# Patient Record
Sex: MALE | Race: WHITE | NOT HISPANIC OR LATINO | URBAN - METROPOLITAN AREA
[De-identification: names, ages, dates, MRNs, and addresses within clinical notes are randomized per-mention and may not be internally consistent; named-entity substitution may affect disease eponyms.]

---

## 2023-04-04 DIAGNOSIS — I48.91 UNSPECIFIED ATRIAL FIBRILLATION (MULTI): ICD-10-CM

## 2023-04-04 RX ORDER — RIVAROXABAN 20 MG/1
TABLET, FILM COATED ORAL
Qty: 90 TABLET | Refills: 0 | Status: SHIPPED | OUTPATIENT
Start: 2023-04-04 | End: 2023-08-11

## 2023-05-19 ENCOUNTER — OFFICE VISIT (OUTPATIENT)
Dept: PRIMARY CARE | Facility: CLINIC | Age: 58
End: 2023-05-19
Payer: COMMERCIAL

## 2023-05-19 VITALS
SYSTOLIC BLOOD PRESSURE: 127 MMHG | HEART RATE: 85 BPM | BODY MASS INDEX: 42.97 KG/M2 | DIASTOLIC BLOOD PRESSURE: 76 MMHG | WEIGHT: 315 LBS

## 2023-05-19 DIAGNOSIS — M54.6 ACUTE THORACIC BACK PAIN, UNSPECIFIED BACK PAIN LATERALITY: Primary | ICD-10-CM

## 2023-05-19 PROBLEM — F32.A DEPRESSION: Status: ACTIVE | Noted: 2023-05-19

## 2023-05-19 PROBLEM — N13.8 BPH WITH OBSTRUCTION/LOWER URINARY TRACT SYMPTOMS: Status: ACTIVE | Noted: 2023-05-19

## 2023-05-19 PROBLEM — F41.9 ANXIETY DISORDER: Status: ACTIVE | Noted: 2023-05-19

## 2023-05-19 PROBLEM — C62.12 MALIGNANT NEOPLASM OF DESCENDED LEFT TESTIS (MULTI): Status: ACTIVE | Noted: 2023-05-19

## 2023-05-19 PROBLEM — K21.9 GASTROESOPHAGEAL REFLUX DISEASE: Status: ACTIVE | Noted: 2023-05-19

## 2023-05-19 PROBLEM — Z98.84 GASTRIC BYPASS STATUS FOR OBESITY: Status: ACTIVE | Noted: 2023-05-19

## 2023-05-19 PROBLEM — E29.1 HYPOGONADISM IN MALE: Status: ACTIVE | Noted: 2018-02-06

## 2023-05-19 PROBLEM — N40.1 BPH WITH OBSTRUCTION/LOWER URINARY TRACT SYMPTOMS: Status: ACTIVE | Noted: 2023-05-19

## 2023-05-19 PROBLEM — E03.9 HYPOTHYROIDISM: Status: ACTIVE | Noted: 2023-05-19

## 2023-05-19 PROBLEM — I48.91 AFIB (MULTI): Status: ACTIVE | Noted: 2023-05-19

## 2023-05-19 PROCEDURE — 1036F TOBACCO NON-USER: CPT | Performed by: INTERNAL MEDICINE

## 2023-05-19 PROCEDURE — 99213 OFFICE O/P EST LOW 20 MIN: CPT | Performed by: INTERNAL MEDICINE

## 2023-05-19 RX ORDER — LEVOTHYROXINE SODIUM 50 UG/1
50 TABLET ORAL
COMMUNITY
End: 2023-10-19 | Stop reason: ALTCHOICE

## 2023-05-19 RX ORDER — BUPROPION HYDROCHLORIDE 150 MG/1
150 TABLET, EXTENDED RELEASE ORAL 2 TIMES DAILY
COMMUNITY
End: 2024-04-09 | Stop reason: SDUPTHER

## 2023-05-19 RX ORDER — ESOMEPRAZOLE MAGNESIUM 40 MG/1
40 CAPSULE, DELAYED RELEASE ORAL
COMMUNITY
End: 2023-08-02

## 2023-05-19 RX ORDER — SILDENAFIL 100 MG/1
100 TABLET, FILM COATED ORAL AS NEEDED
COMMUNITY
Start: 2014-04-22 | End: 2023-11-17

## 2023-05-19 RX ORDER — METHOCARBAMOL 500 MG/1
500 TABLET, FILM COATED ORAL 3 TIMES DAILY
Qty: 15 TABLET | Refills: 0 | Status: SHIPPED | OUTPATIENT
Start: 2023-05-19 | End: 2023-10-19 | Stop reason: ALTCHOICE

## 2023-05-19 ASSESSMENT — PATIENT HEALTH QUESTIONNAIRE - PHQ9
2. FEELING DOWN, DEPRESSED OR HOPELESS: NOT AT ALL
SUM OF ALL RESPONSES TO PHQ9 QUESTIONS 1 AND 2: 0
1. LITTLE INTEREST OR PLEASURE IN DOING THINGS: NOT AT ALL

## 2023-05-19 NOTE — ASSESSMENT & PLAN NOTE
Musculoskeletal back pain.  Encourage stretches and exercises.  We will provide muscle relaxer.  Continue Tylenol as needed avoid ibuprofen.  Consider physical therapy if no improvement in 1 to 2 weeks  Ice area

## 2023-05-19 NOTE — PROGRESS NOTES
Subjective   Patient ID: Matty Breaux is a 57 y.o. male who presents for Back Pain (Mid back pain).    HPI     Patient is a 57-year-old male with past medical history of atrial fibrillation depression anxiety hypothyroidism who presents with chief complaint of mid back pain.  Is located in the T10 region.  It mostly affecting the right side.  No alleviating factors.  He denies any strain or injury.  Tried Tylenol and ibuprofen without significant relief.  Pain is 5 out of 10 in intensity and is not shooting.  Described as dull and achy    Review of Systems  Constitutional: No fever or chills  Cardiovascular: no chest pain, no palpitations and no syncope.   Respiratory: no cough, no shortness of breath during exertion and no shortness of breath at rest.   Gastrointestinal: no abdominal pain, no nausea and no vomiting.  Neuro: No Headache, no dizziness    Objective   /76   Pulse 85   Wt (!) 160 kg (353 lb)   BMI 42.97 kg/m²     Physical Exam  Constitutional: Alert and in no acute distress. Well developed, well nourished  Head and Face: Head and face: Normal.    Cardiovascular: Heart rate and rhythm were normal, normal S1 and S2. No peripheral edema.   Pulmonary: No respiratory distress. Clear bilateral breath sounds.  Musculoskeletal: Paraspinal musculature tenderness to palpation around the right sided teeth 8 through T10 area  Skin: Normal skin color and pigmentation, normal skin turgor, and no rash.    Psychiatric: Judgment and insight: Intact. Mood and affect: Normal.        Lab Results   Component Value Date    WBC 6.8 02/13/2023    HGB 12.5 (L) 02/13/2023    HCT 45.0 02/13/2023     02/13/2023    CHOL 119 02/13/2023    TRIG 56 02/13/2023    HDL 58.3 02/13/2023    ALT 19 02/13/2023    AST 19 02/13/2023     02/13/2023    K 4.8 02/13/2023     02/13/2023    CREATININE 1.05 02/13/2023    BUN 10 02/13/2023    CO2 30 02/13/2023    TSH 3.46 02/13/2023    PSA 2.12 09/15/2022        Electrocardiogram 12 Lead  Atrial fibrillation with premature ventricular or aberrantly conducted complexes  Right bundle branch block  Septal infarct , age undetermined  Abnormal ECG  No previous ECGs available            Assessment/Plan   Problem List Items Addressed This Visit          Other    Acute thoracic back pain - Primary     Musculoskeletal back pain.  Encourage stretches and exercises.  We will provide muscle relaxer.  Continue Tylenol as needed avoid ibuprofen.  Consider physical therapy if no improvement in 1 to 2 weeks  Ice area         Relevant Medications    methocarbamol (Robaxin) 500 mg tablet

## 2023-07-30 DIAGNOSIS — I48.91 ATRIAL FIBRILLATION, UNSPECIFIED TYPE (MULTI): Primary | ICD-10-CM

## 2023-07-31 RX ORDER — METOPROLOL SUCCINATE 25 MG/1
25 TABLET, EXTENDED RELEASE ORAL DAILY
Qty: 90 TABLET | Refills: 1 | Status: SHIPPED | OUTPATIENT
Start: 2023-07-31

## 2023-08-02 DIAGNOSIS — K21.9 GASTRO-ESOPHAGEAL REFLUX DISEASE WITHOUT ESOPHAGITIS: ICD-10-CM

## 2023-08-02 RX ORDER — ESOMEPRAZOLE MAGNESIUM 40 MG/1
40 CAPSULE, DELAYED RELEASE ORAL DAILY
Qty: 90 CAPSULE | Refills: 1 | Status: SHIPPED | OUTPATIENT
Start: 2023-08-02 | End: 2024-02-04

## 2023-08-10 DIAGNOSIS — I48.91 UNSPECIFIED ATRIAL FIBRILLATION (MULTI): ICD-10-CM

## 2023-08-24 LAB — PROSTATE SPECIFIC AG (NG/ML) IN SER/PLAS: 2.45 NG/ML (ref 0–4)

## 2023-08-30 LAB
TESTOSTERONE FREE (CHAN): 277.5 PG/ML (ref 35–155)
TESTOSTERONE,TOTAL,LC-MS/MS: 1239 NG/DL (ref 250–1100)

## 2023-10-15 PROBLEM — R07.9 CHEST PAIN: Status: ACTIVE | Noted: 2023-10-15

## 2023-10-15 PROBLEM — R22.1 LUMP IN NECK: Status: ACTIVE | Noted: 2023-10-15

## 2023-10-15 PROBLEM — R06.02 SOB (SHORTNESS OF BREATH): Status: ACTIVE | Noted: 2023-10-15

## 2023-10-15 PROBLEM — M25.561 RIGHT KNEE PAIN: Status: ACTIVE | Noted: 2023-10-15

## 2023-10-15 PROBLEM — E29.1 HYPOGONADISM MALE: Status: ACTIVE | Noted: 2023-10-15

## 2023-10-15 PROBLEM — R19.00 ABDOMINAL MASS: Status: ACTIVE | Noted: 2023-10-15

## 2023-10-15 PROBLEM — F40.298 OTHER SPECIFIED PHOBIA: Status: ACTIVE | Noted: 2023-10-15

## 2023-10-15 PROBLEM — G56.02 CARPAL TUNNEL SYNDROME OF LEFT WRIST: Status: ACTIVE | Noted: 2023-10-15

## 2023-10-15 PROBLEM — N52.9 ERECTILE DYSFUNCTION: Status: ACTIVE | Noted: 2023-10-15

## 2023-10-15 PROBLEM — F32.9 MAJOR DEPRESSION: Status: ACTIVE | Noted: 2023-10-15

## 2023-10-15 PROBLEM — R79.89 LOW TESTOSTERONE IN MALE: Status: ACTIVE | Noted: 2023-10-15

## 2023-10-15 PROBLEM — D17.0 LIPOMA OF SKIN AND SUBCUTANEOUS TISSUE OF NECK: Status: ACTIVE | Noted: 2023-10-15

## 2023-10-15 PROBLEM — G56.01 CARPAL TUNNEL SYNDROME OF RIGHT WRIST: Status: ACTIVE | Noted: 2023-10-15

## 2023-10-15 PROBLEM — M25.512 LEFT SHOULDER PAIN: Status: ACTIVE | Noted: 2023-10-15

## 2023-10-15 PROBLEM — J98.6 DIAPHRAGMATIC DISORDER: Status: ACTIVE | Noted: 2023-10-15

## 2023-10-15 PROBLEM — S49.92XA INJURY OF LEFT SHOULDER: Status: ACTIVE | Noted: 2023-10-15

## 2023-10-15 PROBLEM — K21.9 ESOPHAGEAL REFLUX: Status: ACTIVE | Noted: 2023-10-15

## 2023-10-15 PROBLEM — M72.0 DUPUYTREN CONTRACTURE: Status: ACTIVE | Noted: 2023-10-15

## 2023-10-15 RX ORDER — MIRTAZAPINE 30 MG/1
30 TABLET, FILM COATED ORAL NIGHTLY
COMMUNITY
Start: 2023-06-15 | End: 2024-04-09 | Stop reason: SDUPTHER

## 2023-10-15 RX ORDER — COVID-19 ANTIGEN TEST
KIT MISCELLANEOUS
COMMUNITY
Start: 2022-12-22 | End: 2023-12-26 | Stop reason: ALTCHOICE

## 2023-10-15 RX ORDER — SYRINGE WITH NEEDLE, 1 ML 25GX5/8"
SYRINGE, EMPTY DISPOSABLE MISCELLANEOUS
COMMUNITY
Start: 2023-05-23

## 2023-10-18 PROBLEM — F32.89 OTHER SPECIFIED DEPRESSIVE EPISODES: Status: ACTIVE | Noted: 2023-10-18

## 2023-10-19 ENCOUNTER — TELEMEDICINE (OUTPATIENT)
Dept: BEHAVIORAL HEALTH | Facility: CLINIC | Age: 58
End: 2023-10-19
Payer: COMMERCIAL

## 2023-10-19 DIAGNOSIS — F43.10 PTSD (POST-TRAUMATIC STRESS DISORDER): ICD-10-CM

## 2023-10-19 DIAGNOSIS — F33.41 RECURRENT MAJOR DEPRESSIVE DISORDER, IN PARTIAL REMISSION (CMS-HCC): ICD-10-CM

## 2023-10-19 DIAGNOSIS — F41.9 ANXIETY DISORDER, UNSPECIFIED TYPE: ICD-10-CM

## 2023-10-19 PROCEDURE — 99214 OFFICE O/P EST MOD 30 MIN: CPT | Performed by: PSYCHIATRY & NEUROLOGY

## 2023-10-19 RX ORDER — HYDROXYZINE PAMOATE 25 MG/1
25 CAPSULE ORAL DAILY PRN
Qty: 30 CAPSULE | Refills: 2 | Status: SHIPPED | OUTPATIENT
Start: 2023-10-19 | End: 2024-04-09 | Stop reason: SDUPTHER

## 2023-10-19 NOTE — PROGRESS NOTES
"Outpatient Psychiatry- Follow up visit    Subjective   Jerry Breaux, a 57 y.o. male, presenting for follow up visit for depression, anxiety and PTSD     HPI:\"Since it is the end of summer, I have to go close everything up at the camper for the winter.\"    Follow up with pt via virtual visit, last visit was  8/24/23, prior to that had in person visit on 6/22/23.     States mood has been pretty good overall. 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 Slipped at the campground recently and had muscles spasms in his back, plans to go back to the gym at the beginning of next week.    States he and his wife were not able to go watch a sunset to commemorate the 2 year anniversary of his mother's death as they had planned but still plan to do that.     Has been seeing therapist every 2 weeks, but she left the practice so he has to find a new therapist, waiting to her what is available to him through his insurance.    States he hasn't needed to use the hydroxyzine 25mg which he uses occasionally for anxiety, as he has not been around crowds or a plane.     Saw urology in follow up appt, testosterone injections decreased from weekly to every 10 days due to supratherapeutic levels. Has done the 10 day interval 3 times now, notices he feels rodríguez and tired by the end of the 10 day period but it is getting better.    OARRS checked and reviewed, filled Testosterone on 9/22/23, no concerns for misuse.          Psychiatric Review Of Systems:  Depressive Symptoms: negative and energy  Manic Symptoms: negative  Anxiety Symptoms: General Anxiety Disorder (CANDELARIO)CANDELARIO Behaviors: excessive anxiety/worry  Psychotic Symptoms: negative  Other Symptoms:    Current Medications:    Current Outpatient Medications:     BD Luer-Mara Syringe 3 mL 25 x 1 1/2 \" syringe, USE AS DIRECTED, Disp: , Rfl:     Flowflex COVID-19 Ag Home Test kit, use as directed, Disp: , Rfl:     mirtazapine (Remeron) 30 mg tablet, Take 1 tablet " "(30 mg) by mouth once daily at bedtime., Disp: , Rfl:     buPROPion SR (Wellbutrin SR) 150 mg 12 hr tablet, Take 1 tablet (150 mg) by mouth twice a day., Disp: , Rfl:     esomeprazole (NexIUM) 40 mg DR capsule, TAKE 1 CAPSULE BY MOUTH EVERY DAY, Disp: 90 capsule, Rfl: 1    levothyroxine (Synthroid, Levoxyl) 50 mcg tablet, Take 1 tablet (50 mcg) by mouth once daily., Disp: , Rfl:     methocarbamol (Robaxin) 500 mg tablet, Take 1 tablet (500 mg) by mouth 3 times a day., Disp: 15 tablet, Rfl: 0    metoprolol succinate XL (Toprol-XL) 25 mg 24 hr tablet, TAKE 1 TABLET DAILY, Disp: 90 tablet, Rfl: 1    NON FORMULARY, BD plastipak syringe 3ML; use as directed, Disp: , Rfl:     rivaroxaban (Xarelto) 20 mg tablet, Take 1 tablet (20 mg) by mouth once daily., Disp: 90 tablet, Rfl: 1    sildenafil (Viagra) 100 mg tablet, Take 1 tablet (100 mg) by mouth if needed for erectile dysfunction., Disp: , Rfl:     syringe with needle 3 mL 23 x 1\" syringe, , Disp: , Rfl:     Medical History:  Past Medical History:   Diagnosis Date    Depression, unspecified 08/04/2021    Depression    Obstructive sleep apnea (adult) (pediatric)     Obstructive sleep apnea    Personal history of other diseases of the musculoskeletal system and connective tissue     History of osteoarthritis    Personal history of other diseases of the musculoskeletal system and connective tissue     History of degenerative disc disease    Personal history of other diseases of the respiratory system 06/03/2014    History of acute bronchitis    Unspecified atrial fibrillation (CMS/[facility]) 11/24/2013    Atrial fibrillation         Substance Use History:  Tobacco use: denies  Use of alcohol: occasional, social use  Use of caffeine:  minimal  Use of other substances: denies  Record Review: brief     Medical Review Of Systems:  Pertinent items are noted in HPI.    Objective   Mental Status Exam  Appearance: Well groomed and dressed  Attitude: Calm, cooperative, and engaged in " "conversation.  Behavior: Appropriate eye contact.   Motor Activity: No psychomotor agitation or retardation. No abnormal movements, tremors or tics. No evidence of extrapyramidal symptoms or tardive dyskinesia.  Speech: Regular rate, rhythm, volume. Spontaneous, no pressured speech.  Mood: \"pretty good\"  Affect: Euthymic, full range, mood congruent.  Thought Process: Linear, logical, and goal-directed. No loose associations or gross thought disorganization.  Thought Content: Denied current suicidal ideation or thoughts of harm to self, denied homicidal ideation or thoughts of harm to others. No delusional thinking elicited. No perseverations or obsessions identified.   Perception: Did not endorse auditory or visual hallucinations, did not appear to be responding to hallucinatory stimuli.   Cognition: Alert, oriented x3. Preserved attention span and concentration, recent and remote memory. Adequate fund of knowledge. No deficits in language.   Insight: Fair, in regards to understanding mental health condition  Judgement: Fair      Vitals:     There were no vitals filed for this visit.  Orders Only on 08/24/2023   Component Date Value Ref Range Status    Testosterone, Total, LC-MS/MS 08/24/2023 1,239 (H)  250 - 1,100 ng/dL Final    Testosterone, Free 08/24/2023 277.5 (H)  35.0 - 155.0 pg/mL Final    PSA 08/24/2023 2.45  0.00 - 4.00 ng/mL Final       Risk Assessment:  Risk of harm to self: Low Risk -- Risk factors include: Depression, Gender, and Medical illness comorbidity  Protective factors include:Denies current suicidal ideation, Willingness to seek help and support , Skills in problem solving, conflict resolution, and nonviolent handling of disputes, Access to a variety of clinical interventions , Receiving and engaged in care for mental, physical, and substance use disorders , History of adhering to treatment recommendations and/or prescribed medication regimen , Support through ongoing medical and mental " healthcare relationships , Current/history of good response to treatment/meds , and Interpersonal relationships and supports, e.g., family, friends, peers, community     Risk of harm to others: Low risk, no risk actors identified    There are no diagnoses linked to this encounter.   Plan/Recommendations:  Medications: Continue Bupropion SR 150mg bid, mirtazapine 30mg at bedtime and hydroxyzine 25mg daily as needed  Follow up with medical providers as needed  Follow up: 2 months  on 12/12/23 at 9:30am for 390 min virtual visit  Call  Psychiatry at [phone] with issues.    Review with patient:   Time Spent:  Prep time: 5min  Direct patient time: 23 min  Documentation time: 10 min  Total time: 38 min    Марина Santo MD

## 2023-11-06 DIAGNOSIS — F41.9 ANXIETY DISORDER, UNSPECIFIED TYPE: ICD-10-CM

## 2023-11-06 RX ORDER — HYDROXYZINE PAMOATE 25 MG/1
25 CAPSULE ORAL DAILY PRN
Qty: 30 CAPSULE | Refills: 2 | Status: CANCELLED | OUTPATIENT
Start: 2023-11-06 | End: 2024-02-04

## 2023-12-04 PROBLEM — N52.9 ED (ERECTILE DYSFUNCTION) OF ORGANIC ORIGIN: Status: ACTIVE | Noted: 2018-02-06

## 2023-12-12 PROBLEM — F43.10 PTSD (POST-TRAUMATIC STRESS DISORDER): Status: ACTIVE | Noted: 2023-12-12

## 2023-12-20 DIAGNOSIS — N52.9 ERECTILE DYSFUNCTION, UNSPECIFIED ERECTILE DYSFUNCTION TYPE: ICD-10-CM

## 2023-12-21 RX ORDER — SILDENAFIL 100 MG/1
TABLET, FILM COATED ORAL
Qty: 30 TABLET | Refills: 0 | Status: SHIPPED | OUTPATIENT
Start: 2023-12-21 | End: 2024-02-05

## 2023-12-26 ENCOUNTER — TELEMEDICINE (OUTPATIENT)
Dept: BEHAVIORAL HEALTH | Facility: CLINIC | Age: 58
End: 2023-12-26
Payer: COMMERCIAL

## 2023-12-26 DIAGNOSIS — F43.10 PTSD (POST-TRAUMATIC STRESS DISORDER): ICD-10-CM

## 2023-12-26 DIAGNOSIS — F33.40 RECURRENT MAJOR DEPRESSIVE DISORDER, IN REMISSION (CMS-HCC): ICD-10-CM

## 2023-12-26 DIAGNOSIS — F41.9 ANXIETY DISORDER, UNSPECIFIED TYPE: ICD-10-CM

## 2023-12-26 PROCEDURE — 99214 OFFICE O/P EST MOD 30 MIN: CPT | Performed by: PSYCHIATRY & NEUROLOGY

## 2023-12-26 NOTE — PROGRESS NOTES
"Outpatient Psychiatry- Follow up visit    Subjective   Matty Breaux \"Jerry\", a 58 y.o. male, presenting for follow up visit for Depression, anxiety and PTSD     HPI:     \"Things have been OK. Things have been kind of stressful with the holidays and my wife's having difficulty with her daughter in law. I have been trying to be calm and supportive. And I am feeling it a lot that my parents are gone now.\"     Follow up with pt via virtual visit, last visit was 10/19/23, last had in person visit on 6/22/23.      States mood has been pretty good overall.  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 Slipped at the campground prior to last visit and is still experiencing back spasms, has seen PCP for that.     Had been seeing therapist every 2 weeks, but she left the practice so he has to find a new therapist, waiting to her what is available to him through his new [employer] insurance.     States he used the hydroxyzine 25mg yesterday for anxiety and prior to that approx a months ago.      Prior to last visit, testosterone injections were decreased from weekly to every 10 days due to supratherapeutic levels. Has done the 10 day interval 6 times now, notices he still feels rodríguez and tired by the end of the 10 day period, is going to get testosterone levels checked again soon and follow up with urologist.     OARRS checked and reviewed, filled Testosterone on 11/24/23, prior to that on 10/24/23, no concerns for misuse.       Psychiatric Review Of Systems:  Depressive Symptoms: negative  Manic Symptoms: negative  Anxiety Symptoms: Negative  Psychotic Symptoms: negative    Current Medications:    Current Outpatient Medications:     BD Luer-Mara Syringe 3 mL 25 x 1 1/2 \" syringe, USE AS DIRECTED, Disp: , Rfl:     buPROPion SR (Wellbutrin SR) 150 mg 12 hr tablet, Take 1 tablet (150 mg) by mouth twice a day., Disp: , Rfl:     esomeprazole (NexIUM) 40 mg DR capsule, TAKE 1 CAPSULE BY MOUTH EVERY DAY, " "Disp: 90 capsule, Rfl: 1    hydrOXYzine pamoate (VistariL) 25 mg capsule, Take 1 capsule (25 mg) by mouth once daily as needed for anxiety., Disp: 30 capsule, Rfl: 2    metoprolol succinate XL (Toprol-XL) 25 mg 24 hr tablet, TAKE 1 TABLET DAILY, Disp: 90 tablet, Rfl: 1    mirtazapine (Remeron) 30 mg tablet, Take 1 tablet (30 mg) by mouth once daily at bedtime., Disp: , Rfl:     NON FORMULARY, BD plastipak syringe 3ML; use as directed, Disp: , Rfl:     rivaroxaban (Xarelto) 20 mg tablet, Take 1 tablet (20 mg) by mouth once daily., Disp: 90 tablet, Rfl: 1    sildenafil (Viagra) 100 mg tablet, TAKE ONE TABLET BY MOUTH EVERY DAY ONE HOUR BEFORE NEEDED, Disp: 30 tablet, Rfl: 0    testosterone cypionate (Depo-Testosterone) 200 mg/mL injection, INJECT 0.75 ML EVERY 10 DAYS, Disp: , Rfl:     testosterone cypionate (Depo-Testosterone) 200 mg/mL injection, INJECT 0.75 ML WEEKLY AS DIRECTED, Disp: , Rfl:     testosterone cypionate (Depo-Testosterone) 200 mg/mL injection, INJECT 0.75 ML WEEKLY AS DIRECTED, Disp: , Rfl:     Medical History:  Past Medical History:   Diagnosis Date    Depression, unspecified 08/04/2021    Depression    Obstructive sleep apnea (adult) (pediatric)     Obstructive sleep apnea    Personal history of other diseases of the musculoskeletal system and connective tissue     History of osteoarthritis    Personal history of other diseases of the musculoskeletal system and connective tissue     History of degenerative disc disease    Personal history of other diseases of the respiratory system 06/03/2014    History of acute bronchitis    Unspecified atrial fibrillation (CMS/[facility]) 11/24/2013    Atrial fibrillation         Substance Use History:  Tobacco use: Denies  Use of alcohol: minimal use, \"a couple beers a week\"  Use of caffeine: caffeinated soft drinks 2 cans /day    Record Review: brief     Medical Review Of Systems:  Pertinent items are noted in HPI.    OARRS:  No data recorded  I have personally " "reviewed the OARRS report for Matty Breuax. I have considered the risks of abuse, dependence, addiction and diversion    Is the patient prescribed a combination of a benzodiazepine and opioid?  No      Objective   Mental Status Exam  Appearance: Well groomed and dressed.  Attitude: Calm, cooperative, and engaged in conversation.  Behavior: Appropriate eye contact.   Motor Activity: No psychomotor agitation or retardation. No abnormal movements, tremors or tics. No evidence of extrapyramidal symptoms or tardive dyskinesia.  Speech: Regular rate, rhythm, volume. Spontaneous, no pressured speech.  Mood: \"Ok, just stressed over the holidays\"  Affect: Euthymic, full range, mood congruent.  Thought Process: Linear, logical, and goal-directed. No loose associations or gross thought disorganization.  Thought Content: Denied current suicidal ideation or thoughts of harm to self, denied homicidal ideation or thoughts of harm to others. No delusional thinking elicited. No perseverations or obsessions identified.   Perception: Did not endorse auditory or visual hallucinations, did not appear to be responding to hallucinatory stimuli.   Cognition: Alert, oriented x3. Preserved attention span and concentration, recent and remote memory. Adequate fund of knowledge. No deficits in language.   Insight: Fair, in regards to understanding mental health condition  Judgement: Fair    Vitals:  There were no vitals filed for this visit.    No visits with results within 3 Month(s) from this visit.   Latest known visit with results is:   Orders Only on 08/24/2023   Component Date Value Ref Range Status    Testosterone, Total, LC-MS/MS 08/24/2023 1,239 (H)  250 - 1,100 ng/dL Final    Testosterone, Free 08/24/2023 277.5 (H)  35.0 - 155.0 pg/mL Final    PSA 08/24/2023 2.45  0.00 - 4.00 ng/mL Final       Risk Assessment:  Risk of harm to self: Low Risk -- Risk factors include: Gender, History of trauma or abuse , and Medical illness " comorbidity  Protective factors include:Denies current suicidal ideation, Future-oriented talk , Willingness to seek help and support , Current/history of good response to treatment/meds , and Interpersonal relationships and supports, e.g., family, friends, peers, community     Risk of harm to others: Low Risk - Risk factors include: No significant risk factors identified on screening. Protective factors include: Lack of known history of harm to others     There are no diagnoses linked to this encounter.     Plan/Recommendations:  Major Depressive, recurrent , Anxiety Disorder, unspecified, PTSD   Will continue meds as prescribed Bupropion SR 150mg bid 90 day supply with 3R, prescribed 8/24/23 and mirtazapine 30mg qhs 90 day supply with 3R prescribed 4/27/23  Continue hydroxyzine 25mg qd prn anxiety, uses infrequently so has supply at home  Pt to pursue whether he can continue with current therapist or needs to find a new one  Follow up with medical providers as needed  Follow up with me in 2 months Tues 2/13/24 at 10:30am for virtual appt.   Call  Psychiatry at [phone] with issues, or communicate needs via [employer]      Review with patient: Treatment plan reviewed with the patient.  Medication risks/benefit reviewed with the patient      Prep time: 5  Direct patient time: 25  Documentation time: 5  Total time: 35    Марина Santo MD

## 2024-01-02 ENCOUNTER — APPOINTMENT (OUTPATIENT)
Dept: UROLOGY | Facility: CLINIC | Age: 59
End: 2024-01-02
Payer: COMMERCIAL

## 2024-01-15 ENCOUNTER — APPOINTMENT (OUTPATIENT)
Dept: UROLOGY | Facility: CLINIC | Age: 59
End: 2024-01-15
Payer: COMMERCIAL

## 2024-02-04 DIAGNOSIS — K21.9 GASTRO-ESOPHAGEAL REFLUX DISEASE WITHOUT ESOPHAGITIS: ICD-10-CM

## 2024-02-04 RX ORDER — ESOMEPRAZOLE MAGNESIUM 40 MG/1
40 CAPSULE, DELAYED RELEASE ORAL DAILY
Qty: 90 CAPSULE | Refills: 0 | Status: SHIPPED | OUTPATIENT
Start: 2024-02-04 | End: 2024-05-06

## 2024-02-05 DIAGNOSIS — N52.9 ERECTILE DYSFUNCTION, UNSPECIFIED ERECTILE DYSFUNCTION TYPE: ICD-10-CM

## 2024-02-05 RX ORDER — SILDENAFIL 100 MG/1
TABLET, FILM COATED ORAL
Qty: 30 TABLET | Refills: 0 | Status: SHIPPED | OUTPATIENT
Start: 2024-02-05 | End: 2024-03-14

## 2024-02-13 ENCOUNTER — TELEMEDICINE (OUTPATIENT)
Dept: BEHAVIORAL HEALTH | Facility: CLINIC | Age: 59
End: 2024-02-13
Payer: COMMERCIAL

## 2024-02-13 DIAGNOSIS — F41.9 ANXIETY DISORDER, UNSPECIFIED TYPE: ICD-10-CM

## 2024-02-13 DIAGNOSIS — F33.40 RECURRENT MAJOR DEPRESSIVE DISORDER, IN REMISSION (CMS-HCC): ICD-10-CM

## 2024-02-13 DIAGNOSIS — F43.10 PTSD (POST-TRAUMATIC STRESS DISORDER): ICD-10-CM

## 2024-02-13 PROCEDURE — 1036F TOBACCO NON-USER: CPT | Performed by: PSYCHIATRY & NEUROLOGY

## 2024-02-13 PROCEDURE — 99214 OFFICE O/P EST MOD 30 MIN: CPT | Performed by: PSYCHIATRY & NEUROLOGY

## 2024-02-13 NOTE — PROGRESS NOTES
"Outpatient Psychiatry- Follow up visit    Subjective   Matty Breaux \"Jerry\", a 58 y.o. male, presenting for follow up visit for   Major Depressive, recurrent , Anxiety Disorder, unspecified, PTSD     HPI:  \"Our first granddaughter was born a couple weeks ago, and my wife has not been allowed to see her yet, and that has been really difficult for my wife. I know that my step son's wife and my wife \"butt heads\", but I don't understand it all, and it is not in my control.\" States the whole dynamic is difficult and affects \"the mood of the house\".     Follow up with pt via virtual visit, last visit was 12/26/23, last had in person visit on 6/22/23.      States mood Is OK, but is affected by the situation with his wife and their daughter in law.  The situation has him questioning his relationship with his siblings. States he has reached out to his youngest sister and she answers in very quick answers, doesn't \"let him in.\" He feels worried about her, she still works but doesn't have any social life, won't let anyone in her house, is involved in a hoarding situation. Pt has communicated to her that he'd like to help, won't  her.    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     Still has not found a therapist, plans to call insurance company again to see what is available to him through his new [employer] insurance.     States he uses the hydroxyzine 25mg periodically, used a few times over the holidays.      Continues with testosterone injections every 10 days   OARRS checked and reviewed, filled Testosterone on 1/24/24, prior to that on 12/26/23, no concerns for misuse.         Psychiatric Review Of Systems:  Depressive Symptoms: negative  Manic Symptoms: negative  Anxiety Symptoms: General Anxiety Disorder (CANDELARIO)CADNELARIO Behaviors: excessive anxiety/worry at times, especially around a lot of people  Psychotic Symptoms: negative      Current Medications:    Current " "Outpatient Medications:     BD Luer-Mara Syringe 3 mL 25 x 1 1/2 \" syringe, USE AS DIRECTED, Disp: , Rfl:     buPROPion SR (Wellbutrin SR) 150 mg 12 hr tablet, Take 1 tablet (150 mg) by mouth twice a day., Disp: , Rfl:     esomeprazole (NexIUM) 40 mg DR capsule, Take 1 capsule (40 mg) by mouth once daily., Disp: 90 capsule, Rfl: 0    hydrOXYzine pamoate (VistariL) 25 mg capsule, Take 1 capsule (25 mg) by mouth once daily as needed for anxiety., Disp: 30 capsule, Rfl: 2    metoprolol succinate XL (Toprol-XL) 25 mg 24 hr tablet, TAKE 1 TABLET DAILY, Disp: 90 tablet, Rfl: 1    mirtazapine (Remeron) 30 mg tablet, Take 1 tablet (30 mg) by mouth once daily at bedtime., Disp: , Rfl:     rivaroxaban (Xarelto) 20 mg tablet, Take 1 tablet (20 mg) by mouth once daily., Disp: 90 tablet, Rfl: 1    sildenafil (Viagra) 100 mg tablet, TAKE ONE TABLET BY MOUTH EVERY DAY ONE HOUR BEFORE NEEDED, Disp: 30 tablet, Rfl: 0    testosterone cypionate (Depo-Testosterone) 200 mg/mL injection, INJECT 0.75 ML EVERY 10 DAYS, Disp: , Rfl:     Medical History:  Past Medical History:   Diagnosis Date    Depression, unspecified 08/04/2021    Depression    Obstructive sleep apnea (adult) (pediatric)     Obstructive sleep apnea    Personal history of other diseases of the musculoskeletal system and connective tissue     History of osteoarthritis    Personal history of other diseases of the musculoskeletal system and connective tissue     History of degenerative disc disease    Personal history of other diseases of the respiratory system 06/03/2014    History of acute bronchitis    Unspecified atrial fibrillation (CMS/[facility]) 11/24/2013    Atrial fibrillation       Record Review: brief     Medical Review Of Systems:  Pertinent items are noted in HPI.    OARRS:  No data recorded  I have personally reviewed the OARRS report for Matty Breaux. I have considered the risks of abuse, dependence, addiction and diversion    Is the patient prescribed a " "combination of a benzodiazepine and opioid?  No      Objective   Mental Status Exam  Appearance: Well groomed and dressed  Attitude: Calm, cooperative, and engaged in conversation.  Behavior: Appropriate eye contact.   Motor Activity: No psychomotor agitation or retardation. No abnormal movements, tremors or tics. No evidence of extrapyramidal symptoms or tardive dyskinesia.  Speech: Regular rate, rhythm, volume. Spontaneous, no pressured speech.  Mood: \"stressed about the situation with my wife and our daughter in law\"  Affect: Euthymic, full range, mood congruent.  Thought Process: Linear, logical, and goal-directed. No loose associations or gross thought disorganization.  Thought Content: Denied current suicidal ideation or thoughts of harm to self, denied homicidal ideation or thoughts of harm to others. No delusional thinking elicited. No perseverations or obsessions identified.   Perception: Did not endorse auditory or visual hallucinations, did not appear to be responding to hallucinatory stimuli.   Cognition: Alert, oriented x3. Preserved attention span and concentration, recent and remote memory. Adequate fund of knowledge. No deficits in language.   Insight: Good, in regards to understanding mental health condition  Judgement: Good    Vitals:  There were no vitals filed for this visit.    No visits with results within 3 Month(s) from this visit.   Latest known visit with results is:   Orders Only on 08/24/2023   Component Date Value Ref Range Status    Testosterone, Total, LC-MS/MS 08/24/2023 1,239 (H)  250 - 1,100 ng/dL Final    Testosterone, Free 08/24/2023 277.5 (H)  35.0 - 155.0 pg/mL Final    PSA 08/24/2023 2.45  0.00 - 4.00 ng/mL Final       Risk Assessment:  Risk of harm to self: Low Risk -- Risk factors include: History of trauma or abuse , Medical illness comorbidity , and Psychosocial stressors including situations in family  Protective factors include:Denies current suicidal ideation, " Future-oriented talk , History of adhering to treatment recommendations and/or prescribed medication regimen , Current/history of good response to treatment/meds , and Interpersonal relationships and supports, e.g., family, friends, peers, community     Risk of harm to others: Low Risk - Risk factors include: No significant risk factors identified on screening. Protective factors include: Lack of known history of harm to others     Diagnoses and all orders for this visit:  Recurrent major depressive disorder, in remission (CMS/[facility])  -     Follow Up In Psychiatry  PTSD (post-traumatic stress disorder)  -     Follow Up In Psychiatry  Anxiety disorder, unspecified type  -     Follow Up In Psychiatry       Plan/Recommendations:    Major Depressive, recurrent , Anxiety Disorder, unspecified, PTSD     Will continue meds as prescribed: Bupropion SR 150mg bid 90 day supply with 3R, prescribed 8/24/23 and mirtazapine 30mg qhs 90 day supply with 3R prescribed 4/27/23  Continue hydroxyzine 25mg qd prn anxiety, uses infrequently so has supply at home  Pt to pursue finding a new therapist Follow up with medical providers as needed  Follow up with me in 2 months Tues 4/9/24 at 10:30am for virtual appt.   Call  Psychiatry at [phone] with issues, or communicate needs via [employer]      Review with patient: Treatment plan reviewed with the patient.  Medication risks/benefit reviewed with the patient    Prep time: 5  Direct patient time: 24  Documentation time: 5  Total time: 34    Марина Santo MD

## 2024-02-16 ENCOUNTER — APPOINTMENT (OUTPATIENT)
Dept: UROLOGY | Facility: CLINIC | Age: 59
End: 2024-02-16
Payer: COMMERCIAL

## 2024-02-22 ENCOUNTER — APPOINTMENT (OUTPATIENT)
Dept: UROLOGY | Facility: CLINIC | Age: 59
End: 2024-02-22
Payer: COMMERCIAL

## 2024-02-29 DIAGNOSIS — E29.1 HYPOGONADISM IN MALE: Primary | ICD-10-CM

## 2024-03-08 ENCOUNTER — LAB (OUTPATIENT)
Dept: LAB | Facility: LAB | Age: 59
End: 2024-03-08
Payer: COMMERCIAL

## 2024-03-08 DIAGNOSIS — E29.1 HYPOGONADISM IN MALE: ICD-10-CM

## 2024-03-08 PROCEDURE — 36415 COLL VENOUS BLD VENIPUNCTURE: CPT

## 2024-03-08 PROCEDURE — 84402 ASSAY OF FREE TESTOSTERONE: CPT

## 2024-03-12 DIAGNOSIS — N52.9 ERECTILE DYSFUNCTION, UNSPECIFIED ERECTILE DYSFUNCTION TYPE: ICD-10-CM

## 2024-03-14 LAB
TESTOSTERONE FREE (CHAN): 198.8 PG/ML (ref 35–155)
TESTOSTERONE,TOTAL,LC-MS/MS: 1063 NG/DL (ref 250–1100)

## 2024-03-14 RX ORDER — SILDENAFIL 100 MG/1
TABLET, FILM COATED ORAL
Qty: 30 TABLET | Refills: 0 | Status: SHIPPED | OUTPATIENT
Start: 2024-03-14 | End: 2024-03-21 | Stop reason: ALTCHOICE

## 2024-03-21 ENCOUNTER — OFFICE VISIT (OUTPATIENT)
Dept: UROLOGY | Facility: CLINIC | Age: 59
End: 2024-03-21
Payer: COMMERCIAL

## 2024-03-21 VITALS
HEIGHT: 76 IN | HEART RATE: 50 BPM | TEMPERATURE: 97.5 F | SYSTOLIC BLOOD PRESSURE: 157 MMHG | BODY MASS INDEX: 40.17 KG/M2 | DIASTOLIC BLOOD PRESSURE: 99 MMHG

## 2024-03-21 DIAGNOSIS — N52.39 OTHER POST-PROCEDURAL ERECTILE DYSFUNCTION: Primary | ICD-10-CM

## 2024-03-21 DIAGNOSIS — E29.1 HYPOGONADISM IN MALE: ICD-10-CM

## 2024-03-21 PROCEDURE — 99214 OFFICE O/P EST MOD 30 MIN: CPT | Performed by: NURSE PRACTITIONER

## 2024-03-21 PROCEDURE — 1036F TOBACCO NON-USER: CPT | Performed by: NURSE PRACTITIONER

## 2024-03-21 RX ORDER — SILDENAFIL 100 MG/1
100 TABLET, FILM COATED ORAL AS NEEDED
Qty: 30 TABLET | Refills: 3 | Status: SHIPPED | OUTPATIENT
Start: 2024-03-21

## 2024-03-21 NOTE — PROGRESS NOTES
"Subjective   Patient ID: Matty Breaux \"Monica" is a 58 y.o. male who presents for Hypogonadism.  57y  male with BPH with LUTS, ED, and hypogonadism secondary to testicular cancer.      Patient diagnosed with left testicular cancer in 2003, s/p radiation and left orchiectomy at that time. He has subsequent hypogonadism maintained with testosterone supplementation, increased to every 10 days in fall 2020. Patient notes moderate increase in fatigue towards the end of his 10-day cycle. Switched frequency to every 7 days in June 2023. Found to be supratherapeutic. Back to every 10 days with therapeutic levels. Good control of symptoms.      Moderate ED. Using sildenafil 100mg PO daily PRN with adequate response.      Mild BPH with LUTS. Occasional urgency. Rare nocturia. Denies all other urinary symptoms, including dysuria, gross hematuria, and nephrolithiasis. Never taken alpha blockers.      Paternal uncle with prostate cancer in 80s. PSA zenith 2.17 in 2020.      Patient has history of sleep apnea prior to gastric bypass in 2008. Repeat sleep study after surgery indicated resolution of symptoms.               Review of Systems   All other systems reviewed and are negative.      Objective   Physical Exam  Vitals reviewed.     Alert and oriented x3  Moist mucous membranes  Breathes easily on room air  Abdomen soft, nondistended. Obese  No edema  No scleral icterus  No focal neurological deficits  Appears stated age, no acute distress    VLADISLAV with smooth and enlarged prostate. No pain or tenderness noted    Lab Results   Component Value Date    PSA 2.45 08/24/2023    PSA 2.12 09/15/2022    PSA 1.44 11/03/2020         Assessment/Plan   Diagnoses and all orders for this visit:  Other post-procedural erectile dysfunction  -     sildenafil (Viagra) 100 mg tablet; Take 1 tablet (100 mg) by mouth if needed for erectile dysfunction for up to 120 doses.  -     CBC and Auto Differential; Future  -     Lipid Panel; " Future  -     Comprehensive metabolic panel; Future  -     Testosterone, free, total; Future  -     PSA; Future  Hypogonadism in male  -     CBC and Auto Differential; Future  -     Lipid Panel; Future  -     Comprehensive metabolic panel; Future  -     Testosterone, free, total; Future  -     PSA; Future    6 month lab follow up       HALEY Roberts-CNP 03/21/24 1:27 PM

## 2024-04-08 ASSESSMENT — PROMIS GLOBAL HEALTH SCALE
EMOTIONAL_PROBLEMS: OFTEN
RATE_MENTAL_HEALTH: GOOD
RATE_AVERAGE_PAIN: 3
CARRYOUT_SOCIAL_ACTIVITIES: GOOD
RATE_QUALITY_OF_LIFE: VERY GOOD
RATE_SOCIAL_SATISFACTION: GOOD
CARRYOUT_PHYSICAL_ACTIVITIES: COMPLETELY
RATE_PHYSICAL_HEALTH: GOOD
RATE_GENERAL_HEALTH: GOOD
RATE_AVERAGE_FATIGUE: MODERATE

## 2024-04-09 ENCOUNTER — TELEMEDICINE (OUTPATIENT)
Dept: BEHAVIORAL HEALTH | Facility: CLINIC | Age: 59
End: 2024-04-09
Payer: COMMERCIAL

## 2024-04-09 DIAGNOSIS — F41.9 ANXIETY DISORDER, UNSPECIFIED TYPE: ICD-10-CM

## 2024-04-09 DIAGNOSIS — F43.10 PTSD (POST-TRAUMATIC STRESS DISORDER): ICD-10-CM

## 2024-04-09 DIAGNOSIS — F33.40 RECURRENT MAJOR DEPRESSIVE DISORDER, IN REMISSION (CMS-HCC): ICD-10-CM

## 2024-04-09 PROCEDURE — 99214 OFFICE O/P EST MOD 30 MIN: CPT | Performed by: PSYCHIATRY & NEUROLOGY

## 2024-04-09 PROCEDURE — 1036F TOBACCO NON-USER: CPT | Performed by: PSYCHIATRY & NEUROLOGY

## 2024-04-09 RX ORDER — HYDROXYZINE PAMOATE 25 MG/1
25 CAPSULE ORAL DAILY PRN
Qty: 90 CAPSULE | Refills: 3 | Status: SHIPPED | OUTPATIENT
Start: 2024-04-09 | End: 2025-04-04

## 2024-04-09 RX ORDER — MIRTAZAPINE 30 MG/1
30 TABLET, FILM COATED ORAL NIGHTLY
Qty: 90 TABLET | Refills: 3 | Status: SHIPPED | OUTPATIENT
Start: 2024-04-09 | End: 2025-04-04

## 2024-04-09 RX ORDER — BUPROPION HYDROCHLORIDE 150 MG/1
150 TABLET, EXTENDED RELEASE ORAL 2 TIMES DAILY
Qty: 180 TABLET | Refills: 3 | Status: SHIPPED | OUTPATIENT
Start: 2024-04-09 | End: 2025-04-04

## 2024-04-09 NOTE — PROGRESS NOTES
"Outpatient Psychiatry- Follow up visit    Subjective   Matty Breaux \"Jerry\", a 58 y.o. male, presenting for follow up visit for Major Depressive Disorder, recurrent, Anxiety Disorder, unspecified, PTSD     HPI:  \"The situation is about the same with our granddaughter. We have only seen her once . My wife is going in July. That helped my wife's mood, so that helps the mood in the house.\"     Follow up with pt via virtual visit, last visit was 2/13/24 las in person visit on 6/22/23.      States mood Is better now that the weather is getting better.  Has two weddings coming up in the family, finds himself getting anxious in anticipation of the larger of the two weddings.     States youngest sister is still \"being a bit of a recluse.\" Other sisters see her and state there does not seem to be a major issue. Is reassured that his other sisters see her, and she is still going ot work work every day. The situation has him questioning his relationship with his siblings. States he has reached out to his youngest sister and she answers in very quick answers, doesn't \"let him in.\" He feels worried about her, she still works but doesn't have any social life, won't let anyone in her house, is involved in a hoarding situation. Pt has communicated to her that he'd like to help, won't  her.     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      Continues with testosterone injections every 10 days   OARRS checked and reviewed, filled Testosterone on 1/24/24, prior to that on 12/26/23, no concerns for misuse.         Psychiatric Review Of Systems:  Depressive Symptoms: negative  Manic Symptoms: negative  Anxiety Symptoms: " "Negative  Psychotic Symptoms: negative      Current Medications:    Current Outpatient Medications:     BD Luer-Mara Syringe 3 mL 25 x 1 1/2 \" syringe, USE AS DIRECTED, Disp: , Rfl:     buPROPion SR (Wellbutrin SR) 150 mg 12 hr tablet, Take 1 tablet (150 mg) by mouth twice a day., Disp: , Rfl:     esomeprazole (NexIUM) 40 mg DR capsule, Take 1 capsule (40 mg) by mouth once daily., Disp: 90 capsule, Rfl: 0    hydrOXYzine pamoate (VistariL) 25 mg capsule, Take 1 capsule (25 mg) by mouth once daily as needed for anxiety., Disp: 30 capsule, Rfl: 2    metoprolol succinate XL (Toprol-XL) 25 mg 24 hr tablet, TAKE 1 TABLET DAILY, Disp: 90 tablet, Rfl: 1    mirtazapine (Remeron) 30 mg tablet, Take 1 tablet (30 mg) by mouth once daily at bedtime., Disp: , Rfl:     rivaroxaban (Xarelto) 20 mg tablet, Take 1 tablet (20 mg) by mouth once daily., Disp: 90 tablet, Rfl: 1    sildenafil (Viagra) 100 mg tablet, Take 1 tablet (100 mg) by mouth if needed for erectile dysfunction for up to 120 doses., Disp: 30 tablet, Rfl: 3    testosterone cypionate (Depo-Testosterone) 200 mg/mL injection, INJECT 0.75 ML EVERY 10 DAYS, Disp: , Rfl:     Medical History:  Past Medical History:   Diagnosis Date    Depression, unspecified 08/04/2021    Depression    Obstructive sleep apnea (adult) (pediatric)     Obstructive sleep apnea    Personal history of other diseases of the musculoskeletal system and connective tissue     History of osteoarthritis    Personal history of other diseases of the musculoskeletal system and connective tissue     History of degenerative disc disease    Personal history of other diseases of the respiratory system 06/03/2014    History of acute bronchitis    Unspecified atrial fibrillation (CMS/[facility]) 11/24/2013    Atrial fibrillation       Substance Use History:  Tobacco use: Denies  Use of alcohol: denied  Use of caffeine: caffeinated soft drinks 2-3 cans /day  Use of other substances: Denies      Record Review: brief   " "  Medical Review Of Systems:  Pertinent items are noted in HPI.    OARRS:  No data recorded  I have personally reviewed the OARRS report for Matty Breaux. I have considered the risks of abuse, dependence, addiction and diversion    Is the patient prescribed a combination of a benzodiazepine and opioid?  No    Objective   Mental Status Exam  Appearance: Well groomed and dressed  Attitude: Calm, cooperative, and engaged in conversation.  Behavior: Appropriate eye contact.   Motor Activity: No psychomotor agitation or retardation. No abnormal movements, tremors or tics. No evidence of extrapyramidal symptoms or tardive dyskinesia.  Speech: Regular rate, rhythm, volume. Spontaneous, no pressured speech.  Mood: \"good\"  Affect: Euthymic, full range, mood congruent.  Thought Process: Linear, logical, and goal-directed. No loose associations or gross thought disorganization.  Thought Content: Denied current suicidal ideation or thoughts of harm to self, denied homicidal ideation or thoughts of harm to others. No delusional thinking elicited. No perseverations or obsessions identified.   Perception: Did not endorse auditory or visual hallucinations, did not appear to be responding to hallucinatory stimuli.   Cognition: Alert, oriented x3. Preserved attention span and concentration, recent and remote memory. Adequate fund of knowledge. No deficits in language.   Insight: Good, in regards to understanding mental health condition  Judgement: Good      Vitals:  There were no vitals filed for this visit.    Lab on 03/08/2024   Component Date Value Ref Range Status    Testosterone, Free 03/08/2024 198.8 (H)  35.0 - 155.0 pg/mL Final    Testosterone, Total, LC-MS/MS 03/08/2024 1063  250 - 1100 ng/dL Final     Risk Assessment:  Risk of harm to self: Low Risk -- Risk factors include: History of trauma or abuse , Medical illness comorbidity , and Psychosocial stressors including family situation  Protective factors " include:Denies current suicidal ideation, Future-oriented talk , History of adhering to treatment recommendations and/or prescribed medication regimen , Current/history of good response to treatment/meds , and Interpersonal relationships and supports, e.g., family, friends, peers, community     Risk of harm to others: Low Risk - Risk factors include: No significant risk factors identified on screening. Protective factors include: Lack of known history of harm to others     Diagnoses and all orders for this visit:  Recurrent major depressive disorder, in remission (CMS/HCC)  -     Follow Up In Psychiatry  PTSD (post-traumatic stress disorder)  -     Follow Up In Psychiatry  Anxiety disorder, unspecified type  -     Follow Up In Psychiatry       Plan/Recommendations:     Will continue meds as prescribed: Bupropion SR 150mg bid 90 day supply with 3R, prescribed 4/9/24 and mirtazapine 30mg qhs 90 day supply with 3R prescribed 4/9/24  Continue hydroxyzine 25mg qd prn anxiety, uses infrequently so has supply at home  Pt to see new therapist  Follow up with medical providers as needed  Follow up with me in 2 months Tues 6/4/224 at 10:30am virtual appt.   Call  Psychiatry at [phone] with issues, or communicate needs via [email or web address]     Review with patient: Treatment plan reviewed with the patient.  Medication risks/benefit reviewed with the patient    Prep time: 5  Direct patient time: 25  Documentation time: 5  Total time: 35    Марина Santo MD

## 2024-04-10 ENCOUNTER — LAB (OUTPATIENT)
Dept: LAB | Facility: LAB | Age: 59
End: 2024-04-10
Payer: COMMERCIAL

## 2024-04-10 ENCOUNTER — OFFICE VISIT (OUTPATIENT)
Dept: PRIMARY CARE | Facility: CLINIC | Age: 59
End: 2024-04-10
Payer: COMMERCIAL

## 2024-04-10 VITALS
BODY MASS INDEX: 43.54 KG/M2 | SYSTOLIC BLOOD PRESSURE: 140 MMHG | HEART RATE: 76 BPM | DIASTOLIC BLOOD PRESSURE: 96 MMHG | HEIGHT: 73 IN

## 2024-04-10 DIAGNOSIS — E03.9 HYPOTHYROIDISM, UNSPECIFIED TYPE: ICD-10-CM

## 2024-04-10 DIAGNOSIS — F32.9 MAJOR DEPRESSIVE DISORDER, REMISSION STATUS UNSPECIFIED, UNSPECIFIED WHETHER RECURRENT: ICD-10-CM

## 2024-04-10 DIAGNOSIS — E29.1 HYPOGONADISM MALE: ICD-10-CM

## 2024-04-10 DIAGNOSIS — Z23 NEED FOR SHINGLES VACCINE: ICD-10-CM

## 2024-04-10 DIAGNOSIS — Z00.00 HEALTH CARE MAINTENANCE: ICD-10-CM

## 2024-04-10 DIAGNOSIS — Z23 NEED FOR PROPHYLACTIC VACCINATION AGAINST DIPHTHERIA AND TETANUS: ICD-10-CM

## 2024-04-10 DIAGNOSIS — E66.01 CLASS 3 SEVERE OBESITY DUE TO EXCESS CALORIES IN ADULT, UNSPECIFIED BMI, UNSPECIFIED WHETHER SERIOUS COMORBIDITY PRESENT (MULTI): Primary | ICD-10-CM

## 2024-04-10 DIAGNOSIS — E66.01 CLASS 3 SEVERE OBESITY DUE TO EXCESS CALORIES IN ADULT, UNSPECIFIED BMI, UNSPECIFIED WHETHER SERIOUS COMORBIDITY PRESENT (MULTI): ICD-10-CM

## 2024-04-10 DIAGNOSIS — I48.11 LONGSTANDING PERSISTENT ATRIAL FIBRILLATION (MULTI): ICD-10-CM

## 2024-04-10 DIAGNOSIS — C62.12 MALIGNANT NEOPLASM OF DESCENDED LEFT TESTIS (MULTI): ICD-10-CM

## 2024-04-10 PROBLEM — G56.01 CARPAL TUNNEL SYNDROME OF RIGHT WRIST: Status: RESOLVED | Noted: 2023-10-15 | Resolved: 2024-04-10

## 2024-04-10 PROBLEM — G56.02 CARPAL TUNNEL SYNDROME OF LEFT WRIST: Status: RESOLVED | Noted: 2023-10-15 | Resolved: 2024-04-10

## 2024-04-10 LAB
IRON SATN MFR SERPL: 7 % (ref 25–45)
IRON SERPL-MCNC: 31 UG/DL (ref 35–150)
TIBC SERPL-MCNC: 431 UG/DL (ref 240–445)
TSH SERPL-ACNC: 3.51 MIU/L (ref 0.44–3.98)
UIBC SERPL-MCNC: 400 UG/DL (ref 110–370)
VIT B12 SERPL-MCNC: 382 PG/ML (ref 211–911)

## 2024-04-10 PROCEDURE — 83540 ASSAY OF IRON: CPT

## 2024-04-10 PROCEDURE — 82607 VITAMIN B-12: CPT

## 2024-04-10 PROCEDURE — 36415 COLL VENOUS BLD VENIPUNCTURE: CPT

## 2024-04-10 PROCEDURE — 84443 ASSAY THYROID STIM HORMONE: CPT

## 2024-04-10 PROCEDURE — 1036F TOBACCO NON-USER: CPT | Performed by: INTERNAL MEDICINE

## 2024-04-10 PROCEDURE — 90750 HZV VACC RECOMBINANT IM: CPT | Performed by: INTERNAL MEDICINE

## 2024-04-10 PROCEDURE — 90715 TDAP VACCINE 7 YRS/> IM: CPT | Performed by: INTERNAL MEDICINE

## 2024-04-10 PROCEDURE — 90471 IMMUNIZATION ADMIN: CPT | Performed by: INTERNAL MEDICINE

## 2024-04-10 PROCEDURE — 84425 ASSAY OF VITAMIN B-1: CPT

## 2024-04-10 PROCEDURE — 83550 IRON BINDING TEST: CPT

## 2024-04-10 PROCEDURE — 90472 IMMUNIZATION ADMIN EACH ADD: CPT | Performed by: INTERNAL MEDICINE

## 2024-04-10 PROCEDURE — 99396 PREV VISIT EST AGE 40-64: CPT | Performed by: INTERNAL MEDICINE

## 2024-04-10 RX ORDER — SEMAGLUTIDE 0.25 MG/.5ML
0.25 INJECTION, SOLUTION SUBCUTANEOUS
Qty: 2 ML | Refills: 0 | Status: SHIPPED | OUTPATIENT
Start: 2024-04-14 | End: 2024-05-06

## 2024-04-10 NOTE — ASSESSMENT & PLAN NOTE
Continue following with psychiatry and psychologist for treatment and therapy.  Symptoms controlled

## 2024-04-10 NOTE — PROGRESS NOTES
"Subjective   Patient ID: Matty Breaux \"Monica" is a 58 y.o. male who presents for No chief complaint on file..          HPI     Patient is a 58-year-old male with past medical history of atrial fibrillation hypogonadism malignant neoplasm of the left testes follows with urology major depression PTSD following with psychiatry presents for wellness.    Patient overall is doing well.  He takes his anticoagulation as prescribed.  Rate is well-controlled.  No palpitations.      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    No specific complaints at this time.  He does report trying to lose weight with dietary modifications and exercise as tolerated however no significant weight loss.  In fact he has gained a significant amount of weight over the last year.  His weight is now 372 pounds.  He is interested in starting Wegovy.    He follows with psychiatry for treatment of his PTSD anxiety and depression.  He states that his symptoms are fair controlled.  He has an upcoming appointment with a psychologist and takes medications as prescribed.  No homicidal or suicidal ideation.        Review of Systems  Constitutional: No fever or chills, No Night Sweats  Eyes: No Blurry Vision or Eye sight problems  ENT: No Nasal Discharge, Hoarseness, sore throat  Cardiovascular: no chest pain, no palpitations and no syncope.   Respiratory: no cough, no shortness of breath during exertion and no shortness of breath at rest.   Gastrointestinal: no abdominal pain, no nausea and no vomiting.   : No issues with urinary stream, burning with urination, no blood in urine or stools  Skin: No Skin rashes or Lesions  Neuro: No Headache, no dizziness or Numbness or tingling  Psych: No Anxiety, depression or sleeping problems  Heme: No Easy bleeding or brusing.     Objective   BP (!) 140/96   Pulse 76   Ht 1.854 m (6' 1\")   BMI 43.54 kg/m²     Physical Exam  Constitutional: Alert and in no acute distress. Well " developed, well nourished.   Head and Face: Head and face: Normal.    Eyes: Normal external exam. Pupils were equal in size, round, reactive to light (PERRL) with normal accommodation and extraocular movements intact (EOMI).   Ears, Nose, Mouth, and Throat: External inspection of ears and nose: Normal.  Hearing: Normal.  Nasal mucosa, septum, and turbinates: Normal.  Lips, teeth, and gums: Normal.  Oropharynx: Normal.   Neck: No neck mass was observed. Supple. Thyroid not enlarged and there were no palpable thyroid nodules.   Cardiovascular: Heart rate and rhythm were normal, normal S1 and S2. Pedal pulses: Normal. No peripheral edema.   Pulmonary: No respiratory distress. Clear bilateral breath sounds.   Abdomen: Soft nontender; no abdominal mass palpated. Normal bowel sounds. No organomegaly.   : Deferred  Musculoskeletal: No joint swelling seen, normal movements of all extremities. Range of motion: Normal.  Muscle strength/tone: Normal.    Skin: Normal skin color and pigmentation, normal skin turgor, and no rash.   Neurologic: Deep tendon reflexes were 2+ and symmetric.   Psychiatric: Judgment and insight: Intact. Mood and affect: Normal.  Lymphatic: No cervical lymphadenopathy. Palpation of lymph nodes in axillae: Normal.  Palpation of lymph nodes in groin: Normal.    Lab Results   Component Value Date    WBC 6.8 02/13/2023    HGB 12.5 (L) 02/13/2023    HCT 45.0 02/13/2023     02/13/2023    CHOL 119 02/13/2023    TRIG 56 02/13/2023    HDL 58.3 02/13/2023    ALT 19 02/13/2023    AST 19 02/13/2023     02/13/2023    K 4.8 02/13/2023     02/13/2023    CREATININE 1.05 02/13/2023    BUN 10 02/13/2023    CO2 30 02/13/2023    TSH 3.46 02/13/2023    PSA 2.45 08/24/2023       Electrocardiogram 12 Lead  Atrial fibrillation with premature ventricular or aberrantly conducted complexes  Right bundle branch block  Septal infarct , age undetermined  Abnormal ECG  No previous ECGs  available      Assessment/Plan   Problem List Items Addressed This Visit             ICD-10-CM    Afib (CMS/[facility]) I48.91     Continue with rate control.  Continue anticoagulation.  Monitor clinically.         Hypothyroidism E03.9     Check TSH and adjust medications pending results         Relevant Orders    Vitamin B12    Iron and TIBC    Vitamin B1, Whole Blood    Malignant neoplasm of descended left testis (CMS/[facility]) C62.12     Continue following with urology for continued surveillance         Hypogonadism male E29.1     Likely secondary to testicular infection.  Continue with testosterone placement per urology         Major depression F32.9     Continue following with psychiatry and psychologist for treatment and therapy.  Symptoms controlled          Other Visit Diagnoses         Codes    Class 3 severe obesity due to excess calories in adult, unspecified BMI, unspecified whether serious comorbidity present (CMS/[facility])    -  Primary E66.01    Relevant Medications    semaglutide, weight loss, (Wegovy) 0.25 mg/0.5 mL pen injector (Start on 4/14/2024)    Other Relevant Orders    Follow Up In [facility] - PCP - Established    Need for prophylactic vaccination against diphtheria and tetanus     Z23    Relevant Orders    Tdap vaccine, age 7 years and older    Health care maintenance     Z00.00    Relevant Orders    TSH with reflex to Free T4 if abnormal    Vitamin B12    Iron and TIBC    Vitamin B1, Whole Blood    Need for shingles vaccine     Z23    Relevant Orders    Zoster vaccine, recombinant, adult (SHINGRIX)          Given significant weight gain and not responding to dietary and behavioral modifications we will trial Wegovy.  Discussed that if its risks and side effects including increased risk of pancreatitis and thyroid cancers.  Patient is agreeable to start the medication.  Will prescribe.  Follow-up 3 months for reevaluation.  Please call our office in 1 month for titration of the medication to  higher dose.  Expect planed that it can cause some nausea and abdominal discomfort over the first 2 weeks and to call if the symptoms are not tolerable    Dear Matty Breaux     It was my pleasure to take care of you today in the office. Below are the things we discussed today:    1. Immunizations: Yearly Flu shot is recommended.          a: COVID: Up-to-date         b: Tetanus: Ordered         c: Shingrix: Ordered  2. Blood Work: Ordered  3. Seen your dentist twice a year  4. Yearly Eye exam is recommended    5. BMI: Greater than 40  6: Diet recommendations:   Eat Clean, Try to have as many home cooked meals as possible  Avoid processed foods which contain excess calories, sugar, and sodium.    7. Exercise recommendations:   150 minutes a week to maintain your weight     If you have to loose weight, you need a better diet and exercise plan.     8. Please get your Living will / Advance directive completed if you do not have one already. Please make sure our office has a copy of the latest one.     9. Colonoscopy: Uptodate  10. PSA: Ordered    11.     Follow up in one year for a Physical. Please call the office before your Physical to see if you need blood work completed prior to your physical.     Please call me if any questions arise from now until your next visit. I will call you after I am done seeing patients. A Doctor is always available by phone when the office is closed. Please feel free to call for help with any problem that you feel shouldn't wait until the office re-opens.     Claudy Sheehan, DO

## 2024-04-11 RX ORDER — SEMAGLUTIDE 0.25 MG/.5ML
0.25 INJECTION, SOLUTION SUBCUTANEOUS
Refills: 0 | OUTPATIENT
Start: 2024-04-14 | End: 2024-05-06

## 2024-04-15 LAB — VIT B1 PYROPHOSHATE BLD-SCNC: 133 NMOL/L (ref 70–180)

## 2024-04-16 ENCOUNTER — PATIENT MESSAGE (OUTPATIENT)
Dept: PRIMARY CARE | Facility: CLINIC | Age: 59
End: 2024-04-16
Payer: COMMERCIAL

## 2024-04-16 DIAGNOSIS — I48.91 UNSPECIFIED ATRIAL FIBRILLATION (MULTI): ICD-10-CM

## 2024-04-17 ENCOUNTER — PATIENT MESSAGE (OUTPATIENT)
Dept: PRIMARY CARE | Facility: CLINIC | Age: 59
End: 2024-04-17
Payer: COMMERCIAL

## 2024-04-17 DIAGNOSIS — Z00.00 HEALTH CARE MAINTENANCE: Primary | ICD-10-CM

## 2024-04-30 DIAGNOSIS — E29.1 HYPOGONADISM IN MALE: Primary | ICD-10-CM

## 2024-05-02 DIAGNOSIS — E29.1 HYPOGONADISM IN MALE: ICD-10-CM

## 2024-05-02 RX ORDER — TESTOSTERONE CYPIONATE 200 MG/ML
INJECTION, SOLUTION INTRAMUSCULAR
Qty: 10 ML | Refills: 1 | Status: SHIPPED | OUTPATIENT
Start: 2024-05-02

## 2024-05-02 RX ORDER — TESTOSTERONE CYPIONATE 200 MG/ML
INJECTION, SOLUTION INTRAMUSCULAR
Qty: 10 ML | Refills: 0 | Status: SHIPPED | OUTPATIENT
Start: 2024-05-02 | End: 2024-05-02 | Stop reason: SDUPTHER

## 2024-05-03 DIAGNOSIS — K21.9 GASTRO-ESOPHAGEAL REFLUX DISEASE WITHOUT ESOPHAGITIS: ICD-10-CM

## 2024-05-06 RX ORDER — ESOMEPRAZOLE MAGNESIUM 40 MG/1
40 CAPSULE, DELAYED RELEASE ORAL DAILY
Qty: 90 CAPSULE | Refills: 3 | Status: SHIPPED | OUTPATIENT
Start: 2024-05-06

## 2024-06-04 ENCOUNTER — TELEMEDICINE (OUTPATIENT)
Dept: BEHAVIORAL HEALTH | Facility: CLINIC | Age: 59
End: 2024-06-04
Payer: COMMERCIAL

## 2024-06-04 DIAGNOSIS — F33.40 RECURRENT MAJOR DEPRESSIVE DISORDER, IN REMISSION (CMS-HCC): ICD-10-CM

## 2024-06-04 DIAGNOSIS — F41.9 ANXIETY DISORDER, UNSPECIFIED TYPE: ICD-10-CM

## 2024-06-04 DIAGNOSIS — F43.10 PTSD (POST-TRAUMATIC STRESS DISORDER): ICD-10-CM

## 2024-06-04 PROCEDURE — 99214 OFFICE O/P EST MOD 30 MIN: CPT | Performed by: PSYCHIATRY & NEUROLOGY

## 2024-06-04 NOTE — PROGRESS NOTES
"Outpatient Psychiatry- Follow up visit    Subjective   Matty Breaux \"Monica", a 58 y.o. male, presenting for follow up visit for MDD, PTSD, Anxiety, last visit was 4/9/24, last in person visit 6/22/23    HPI:  Virtual or Telephone Consent    An interactive audio and video telecommunication system which permits real time communications between the patient (at the originating site) and provider (at the distant site) was utilized to provide this telehealth service.   Verbal consent was requested and obtained from Matty Breaux on this date, 06/04/24 for a telehealth visit.      \"Last weekend we had a wedding shower and the baby's christening, which was somewhat awkward as the step father.\"     States mood Is better now that the weather is getting better.  Has two weddings coming up in the family, finds himself getting anxious in anticipation of the larger of the two weddings, in Sept and Oct.  Baby's christening was last weekend.     States youngest sister is still \"being a bit of a recluse.\" She finished teaching for the year, but still doesn't communicate with pt. Is reassured that his other sisters see her, and she was still going to work work every day. He feels worried about her, she still works but doesn't have any social life, won't let anyone in her house, is involved in a hoarding situation. Pt has communicated to her that he'd like to help, won't  her. He is planning to try to see her when he is in [place] in the near future.     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    Has started seeing a new therapist who he has seen virtually, and she is moving to [place]. They have developed a therapy plan he will send so we can scan into his chart.    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     States he uses the hydroxyzine 25mg periodically.     Continues with testosterone " "injections every 10 days   OARRS checked and reviewed, filled Testosterone on 5/30/24, prior to that on  5/2/24, no concerns for misuse.         Psychiatric Review Of Systems:  Depressive Symptoms: negative  Manic Symptoms: negative  Anxiety Symptoms: Negative  Psychotic Symptoms: negative      Current Medications:  Current Outpatient Medications:     BD Luer-Mara Syringe 3 mL 25 x 1 1/2 \" syringe, USE AS DIRECTED, Disp: , Rfl:     buPROPion SR (Wellbutrin SR) 150 mg 12 hr tablet, Take 1 tablet (150 mg) by mouth 2 times a day., Disp: 180 tablet, Rfl: 3    esomeprazole (NexIUM) 40 mg DR capsule, Take 1 capsule (40 mg) by mouth once daily., Disp: 90 capsule, Rfl: 3    hydrOXYzine pamoate (VistariL) 25 mg capsule, Take 1 capsule (25 mg) by mouth once daily as needed for anxiety., Disp: 90 capsule, Rfl: 3    metoprolol succinate XL (Toprol-XL) 25 mg 24 hr tablet, TAKE 1 TABLET DAILY, Disp: 90 tablet, Rfl: 1    mirtazapine (Remeron) 30 mg tablet, Take 1 tablet (30 mg) by mouth once daily at bedtime., Disp: 90 tablet, Rfl: 3    rivaroxaban (Xarelto) 20 mg tablet, Take 1 tablet (20 mg) by mouth once daily., Disp: 90 tablet, Rfl: 1    semaglutide, weight loss, (Wegovy) 0.25 mg/0.5 mL pen injector, Inject 0.25 mg under the skin 1 (one) time per week for 4 doses., Disp: 2 mL, Rfl: 0    sildenafil (Viagra) 100 mg tablet, Take 1 tablet (100 mg) by mouth if needed for erectile dysfunction for up to 120 doses., Disp: 30 tablet, Rfl: 3    testosterone cypionate (Depo-Testosterone) 200 mg/mL injection, INJECT 0.75 ML EVERY 10 DAYS, Disp: 10 mL, Rfl: 1    Medical History:  Past Medical History:   Diagnosis Date    Depression, unspecified 08/04/2021    Depression    Obstructive sleep apnea (adult) (pediatric)     Obstructive sleep apnea    Personal history of other diseases of the musculoskeletal system and connective tissue     History of osteoarthritis    Personal history of other diseases of the musculoskeletal system and " "connective tissue     History of degenerative disc disease    Personal history of other diseases of the respiratory system 06/03/2014    History of acute bronchitis    Unspecified atrial fibrillation (Multi) 11/24/2013    Atrial fibrillation       Substance Use History:  Tobacco use: Denies  Use of alcohol: denied  Use of caffeine: caffeinated soft drinks 2-3 /day  Use of other substances: Denies    Record Review: brief     Medical Review Of Systems:  Pertinent items are noted in HPI.    OARRS:  No data recorded  I have personally reviewed the OARRS report for Matty Breaux. I have considered the risks of abuse, dependence, addiction and diversion    Is the patient prescribed a combination of a benzodiazepine and opioid?  No      Objective   Mental Status Exam  Appearance: Well groomed and dressed  Attitude: Calm, cooperative, and engaged in conversation.  Behavior: Appropriate eye contact.   Motor Activity: No psychomotor agitation or retardation. No abnormal movements, tremors or tics. No evidence of extrapyramidal symptoms or tardive dyskinesia.  Speech: Regular rate, rhythm, volume. Spontaneous, no pressured speech.  Mood: \"good\"  Affect: Euthymic, full range, mood congruent.  Thought Process: Linear, logical, and goal-directed. No loose associations or gross thought disorganization.  Thought Content: Denied current suicidal ideation or thoughts of harm to self, denied homicidal ideation or thoughts of harm to others. No delusional thinking elicited. No perseverations or obsessions identified.   Perception: Did not endorse auditory or visual hallucinations, did not appear to be responding to hallucinatory stimuli.   Cognition: Alert, oriented x3. Preserved attention span and concentration, recent and remote memory. Adequate fund of knowledge. No deficits in language.   Insight: Fair, in regards to understanding mental health condition  Judgement: Fair      Vitals:  There were no vitals filed for this " visit.  Lab on 04/10/2024   Component Date Value Ref Range Status    Thyroid Stimulating Hormone 04/10/2024 3.51  0.44 - 3.98 mIU/L Final    Vitamin B12 04/10/2024 382  211 - 911 pg/mL Final    Iron 04/10/2024 31 (L)  35 - 150 ug/dL Final    UIBC 04/10/2024 400 (H)  110 - 370 ug/dL Final    TIBC 04/10/2024 431  240 - 445 ug/dL Final    % Saturation 04/10/2024 7 (L)  25 - 45 % Final    Vitamin B1, Whole Blood 04/10/2024 133  70 - 180 nmol/L Final   Lab on 03/08/2024   Component Date Value Ref Range Status    Testosterone, Free 03/08/2024 198.8 (H)  35.0 - 155.0 pg/mL Final    Testosterone, Total, LC-MS/MS 03/08/2024 1063  250 - 1100 ng/dL Final       Risk Assessment:  Risk of harm to self: Low Risk -- Risk factors include: History of trauma or abuse , Medical illness comorbidity , and Psychosocial stressors including family stress  Protective factors include:Denies current suicidal ideation, Future-oriented talk , History of adhering to treatment recommendations and/or prescribed medication regimen , Current/history of good response to treatment/meds , and Interpersonal relationships and supports, e.g., family, friends, peers, community     Risk of harm to others: Low Risk - Risk factors include: No significant risk factors identified on screening. Protective factors include: Lack of known history of harm to others     Diagnoses and all orders for this visit:  Recurrent major depressive disorder, in remission (CMS-HCC)  -     Follow Up In Psychiatry  PTSD (post-traumatic stress disorder)  -     Follow Up In Psychiatry  Anxiety disorder, unspecified type  -     Follow Up In Psychiatry       Plan/Recommendations:    Will continue meds as prescribed: Bupropion SR 150mg bid 90 day supply with 3R, prescribed 4/9/24 and mirtazapine 30mg qhs 90 day supply with 3R prescribed 4/9/24  Continue hydroxyzine 25mg qd prn anxiety, uses infrequently so has supply at home  Pt to see new therapist  Follow up with medical providers as  needed  Follow up with me in 2 months Tues 7/23/24 at 9:00am for a virtual appt.   Call  Psychiatry at [phone] with issues, or communicate needs via MyChart    Review with patient: Treatment plan reviewed with the patient.  Medication risks/benefit reviewed with the patient    Prep time: 5  Direct patient time: 21  Documentation time: 5  Total time: 31    Марина Santo MD

## 2024-07-10 ENCOUNTER — APPOINTMENT (OUTPATIENT)
Dept: PRIMARY CARE | Facility: CLINIC | Age: 59
End: 2024-07-10
Payer: COMMERCIAL

## 2024-07-17 PROBLEM — C62.90 MALIGNANT NEOPLASM OF TESTICLE (MULTI): Status: ACTIVE | Noted: 2023-05-19

## 2024-07-17 PROBLEM — M25.512 PAIN OF LEFT SHOULDER REGION: Status: ACTIVE | Noted: 2023-10-15

## 2024-07-17 PROBLEM — N52.9 MALE ERECTILE DYSFUNCTION, UNSPECIFIED: Status: ACTIVE | Noted: 2018-02-06

## 2024-07-17 PROBLEM — Z12.12 SCREENING FOR RECTAL CANCER: Status: ACTIVE | Noted: 2023-10-15

## 2024-07-17 PROBLEM — F32.A DEPRESSIVE DISORDER: Status: ACTIVE | Noted: 2023-05-19

## 2024-07-17 PROBLEM — N40.1 BENIGN PROSTATIC HYPERPLASIA WITH URINARY OBSTRUCTION: Status: ACTIVE | Noted: 2023-05-19

## 2024-07-17 PROBLEM — J98.6 DISORDER OF DIAPHRAGM: Status: ACTIVE | Noted: 2023-10-15

## 2024-07-17 PROBLEM — G56.00 CARPAL TUNNEL SYNDROME: Status: ACTIVE | Noted: 2024-07-17

## 2024-07-17 PROBLEM — I48.91 ATRIAL FIBRILLATION (MULTI): Status: ACTIVE | Noted: 2023-05-19

## 2024-07-17 PROBLEM — E29.1 TESTICULAR HYPOFUNCTION: Status: ACTIVE | Noted: 2023-05-19

## 2024-07-17 PROBLEM — Z98.84 HISTORY OF GASTRIC BYPASS: Status: ACTIVE | Noted: 2023-05-19

## 2024-07-17 PROBLEM — F40.9 PHOBIA: Status: ACTIVE | Noted: 2023-05-19

## 2024-07-17 PROBLEM — R22.1 MASS OF NECK: Status: ACTIVE | Noted: 2023-10-15

## 2024-07-17 PROBLEM — M25.569 KNEE PAIN: Status: ACTIVE | Noted: 2024-07-17

## 2024-07-17 PROBLEM — G47.33 OBSTRUCTIVE SLEEP APNEA SYNDROME: Status: ACTIVE | Noted: 2024-07-17

## 2024-07-17 PROBLEM — M72.0 DUPUYTREN'S CONTRACTURE: Status: ACTIVE | Noted: 2023-10-15

## 2024-07-17 PROBLEM — N13.8 BENIGN PROSTATIC HYPERPLASIA WITH URINARY OBSTRUCTION: Status: ACTIVE | Noted: 2023-05-19

## 2024-07-17 PROBLEM — E66.01 SEVERE OBESITY (MULTI): Status: ACTIVE | Noted: 2024-07-17

## 2024-07-17 PROBLEM — E29.1 HYPOGONADISM IN MALE: Status: ACTIVE | Noted: 2024-07-17

## 2024-07-22 PROBLEM — R06.02 SHORTNESS OF BREATH: Status: ACTIVE | Noted: 2023-10-15

## 2024-07-23 ENCOUNTER — APPOINTMENT (OUTPATIENT)
Dept: BEHAVIORAL HEALTH | Facility: CLINIC | Age: 59
End: 2024-07-23
Payer: COMMERCIAL

## 2024-07-23 DIAGNOSIS — F33.40 RECURRENT MAJOR DEPRESSIVE DISORDER, IN REMISSION (CMS-HCC): ICD-10-CM

## 2024-07-23 DIAGNOSIS — F43.10 PTSD (POST-TRAUMATIC STRESS DISORDER): ICD-10-CM

## 2024-07-23 DIAGNOSIS — F41.9 ANXIETY DISORDER, UNSPECIFIED TYPE: ICD-10-CM

## 2024-07-23 PROCEDURE — 99213 OFFICE O/P EST LOW 20 MIN: CPT | Performed by: PSYCHIATRY & NEUROLOGY

## 2024-07-23 NOTE — PROGRESS NOTES
"Outpatient Psychiatry    Subjective   Matty Breaux \"Mnoica", a 58 y.o. male, presenting for virtual follow up visit, last appt was 6/4/24.      Virtual or Telephone Consent  An interactive audio and video telecommunication system which permits real time communications between the patient (at the originating site) and provider (at the distant site) was utilized to provide this telehealth service.   Verbal consent was requested and obtained from Matty Breaux on this date, 07/23/24 for a telehealth visit.      HPI:  \"My anxiety was a little high when we took the camper to [facility] to see our son, his wife and baby. It was the first time we took the camper anywhere.\"    Was able to visit some [place] which was interesting to him.   Got to see the baby every day, being with their daughter in law was somewhat stressful.     Returned a week ago Sunday, pt stayed at the [facility], they were supposed to have a family camp out but wife got Covid.  Mood and anxiety level have improved since returning  home.  Tired to visit reclusive sister with sibling when he was in [place] but she would not let them in her house, which has happened before, pt attributes this to her hoarding disorder.  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     States he uses the hydroxyzine 25mg periodically.     Continues with testosterone injections every 10 days   OARRS checked and reviewed, filled Testosterone on 6/28/24, prior to that on 5/30/24, no concerns for misuse.      From 6/4/24 visit:  Has two weddings coming up in the family, finds himself getting anxious in anticipation of the larger of the two weddings, in Sept and Oct.  " "Baby's christening was last weekend.     States youngest sister is still \"being a bit of a recluse.\" She finished teaching for the year, but still doesn't communicate with pt. Is reassured that his other sisters see her, and she was still going to work work every day. He feels worried about her, she still works but doesn't have any social life, won't let anyone in her house, is involved in a hoarding situation. Pt has communicated to her that he'd like to help, won't  her. He is planning to try to see her when he is in [place] in the near future.       Psychiatric Review Of Systems:  Depressive Symptoms: negative  Manic Symptoms: negative  Anxiety Symptoms: Negative  Psychotic Symptoms: negative    Current Medications:  Current Outpatient Medications:     BD Luer-Mara Syringe 3 mL 25 x 1 1/2 \" syringe, USE AS DIRECTED, Disp: , Rfl:     buPROPion SR (Wellbutrin SR) 150 mg 12 hr tablet, Take 1 tablet (150 mg) by mouth 2 times a day., Disp: 180 tablet, Rfl: 3    esomeprazole (NexIUM) 40 mg DR capsule, Take 1 capsule (40 mg) by mouth once daily., Disp: 90 capsule, Rfl: 3    hydrOXYzine pamoate (VistariL) 25 mg capsule, Take 1 capsule (25 mg) by mouth once daily as needed for anxiety., Disp: 90 capsule, Rfl: 3    metoprolol succinate XL (Toprol-XL) 25 mg 24 hr tablet, TAKE 1 TABLET DAILY, Disp: 90 tablet, Rfl: 1    mirtazapine (Remeron) 30 mg tablet, Take 1 tablet (30 mg) by mouth once daily at bedtime., Disp: 90 tablet, Rfl: 3    rivaroxaban (Xarelto) 20 mg tablet, Take 1 tablet (20 mg) by mouth once daily., Disp: 90 tablet, Rfl: 1    semaglutide, weight loss, (Wegovy) 0.25 mg/0.5 mL pen injector, Inject 0.25 mg under the skin 1 (one) time per week for 4 doses., Disp: 2 mL, Rfl: 0    sildenafil (Viagra) 100 mg tablet, Take 1 tablet (100 mg) by mouth if needed for erectile dysfunction for up to 120 doses., Disp: 30 tablet, Rfl: 3    testosterone cypionate (Depo-Testosterone) 200 mg/mL injection, INJECT 0.75 ML " "EVERY 10 DAYS, Disp: 10 mL, Rfl: 1    Medical History:  Past Medical History:   Diagnosis Date    Depression, unspecified 08/04/2021    Depression    Obstructive sleep apnea (adult) (pediatric)     Obstructive sleep apnea    Personal history of other diseases of the musculoskeletal system and connective tissue     History of osteoarthritis    Personal history of other diseases of the musculoskeletal system and connective tissue     History of degenerative disc disease    Personal history of other diseases of the respiratory system 06/03/2014    History of acute bronchitis    Unspecified atrial fibrillation (Multi) 11/24/2013    Atrial fibrillation       Substance Use History:  Tobacco use: Denies  Use of alcohol: denied  Use of caffeine: 2-3 caffeinated soft drinks per day  Use of other substances: Denies    Record Review: brief     Medical Review Of Systems:  Pertinent items are noted in HPI.    OARRS:  I have personally reviewed the OARRS report for Matty Breaux. I have considered the risks of abuse, dependence, addiction and diversion    Is the patient prescribed a combination of a benzodiazepine and opioid?  No    Objective   Mental Status Exam  Appearance: Well groomed and dressed  Attitude: Calm, cooperative, and engaged in conversation.  Behavior: Appropriate eye contact.   Motor Activity: No psychomotor agitation or retardation. No abnormal movements, tremors or tics. No evidence of extrapyramidal symptoms or tardive dyskinesia.  Speech: Regular rate, rhythm, volume. Spontaneous, no pressured speech.  Mood: \"good\"  Affect: Euthymic, full range, mood congruent.  Thought Process: Linear, logical, and goal-directed. No loose associations or gross thought disorganization.  Thought Content: Denied current suicidal ideation or thoughts of harm to self, denied homicidal ideation or thoughts of harm to others. No delusional thinking elicited. No perseverations or obsessions identified.   Perception: Did not " endorse auditory or visual hallucinations, did not appear to be responding to hallucinatory stimuli.   Cognition: Alert, oriented x3. Preserved attention span and concentration, recent and remote memory. Adequate fund of knowledge. No deficits in language.   Insight: Fair, in regards to understanding mental health condition  Judgement: Fair      Vitals:  There were no vitals filed for this visit.    Other Objective Information:  No visits with results within 3 Month(s) from this visit.   Latest known visit with results is:   Lab on 04/10/2024   Component Date Value Ref Range Status    Thyroid Stimulating Hormone 04/10/2024 3.51  0.44 - 3.98 mIU/L Final    Vitamin B12 04/10/2024 382  211 - 911 pg/mL Final    Iron 04/10/2024 31 (L)  35 - 150 ug/dL Final    UIBC 04/10/2024 400 (H)  110 - 370 ug/dL Final    TIBC 04/10/2024 431  240 - 445 ug/dL Final    % Saturation 04/10/2024 7 (L)  25 - 45 % Final    Vitamin B1, Whole Blood 04/10/2024 133  70 - 180 nmol/L Final         Risk Assessment:  Risk of harm to self: Low Risk -- Risk factors include: History of trauma or abuse , Medical illness comorbidity , and Psychosocial stressors including family stress  Protective factors include:Denies current suicidal ideation, Future-oriented talk , History of adhering to treatment recommendations and/or prescribed medication regimen , Current/history of good response to treatment/meds , and Interpersonal relationships and supports, e.g., family, friends, peers, community     Risk of harm to others: Low Risk - Risk factors include: No significant risk factors identified on screening. Protective factors include: Lack of known history of harm to others     Diagnoses and all orders for this visit:  Recurrent major depressive disorder, in remission (CMS-HCC)  -     Follow Up In Psychiatry  PTSD (post-traumatic stress disorder)  -     Follow Up In Psychiatry  Anxiety disorder, unspecified type  -     Follow Up In Psychiatry         Plan/Recommendations:    Will continue meds as prescribed: Bupropion SR 150mg bid 90 day supply with 3R, prescribed 4/9/24 and mirtazapine 30mg qhs 90 day supply with 3R prescribed 4/9/24  Continue hydroxyzine 25mg qd prn anxiety, uses infrequently so has supply at home  Pt to continue to see new therapist  Follow up with medical providers as needed  Follow up with me in 2 months Thurs 9/12/24 at 10am for a virtual appt.   Call  Psychiatry at [phone] with issues, or communicate needs via MyChart    Review with patient: Treatment plan reviewed with the patient.  Medication risks/benefit reviewed with the patient    Prep 5 min  Pt visit 15min  Documentation 5  min  Total 25 min      Марина Santo MD

## 2024-08-13 DIAGNOSIS — N52.39 OTHER POST-PROCEDURAL ERECTILE DYSFUNCTION: ICD-10-CM

## 2024-08-13 RX ORDER — SILDENAFIL 100 MG/1
100 TABLET, FILM COATED ORAL AS NEEDED
Qty: 30 TABLET | Refills: 0 | Status: SHIPPED | OUTPATIENT
Start: 2024-08-13

## 2024-09-12 ENCOUNTER — APPOINTMENT (OUTPATIENT)
Dept: UROLOGY | Facility: CLINIC | Age: 59
End: 2024-09-12
Payer: COMMERCIAL

## 2024-09-12 DIAGNOSIS — N52.39 OTHER POST-PROCEDURAL ERECTILE DYSFUNCTION: ICD-10-CM

## 2024-09-15 RX ORDER — SILDENAFIL 100 MG/1
TABLET, FILM COATED ORAL
Qty: 30 TABLET | Refills: 0 | Status: SHIPPED | OUTPATIENT
Start: 2024-09-15

## 2024-10-01 ENCOUNTER — APPOINTMENT (OUTPATIENT)
Dept: BEHAVIORAL HEALTH | Facility: CLINIC | Age: 59
End: 2024-10-01
Payer: COMMERCIAL

## 2024-10-10 DIAGNOSIS — E29.1 HYPOGONADISM IN MALE: ICD-10-CM

## 2024-10-10 RX ORDER — TESTOSTERONE CYPIONATE 200 MG/ML
INJECTION, SOLUTION INTRAMUSCULAR
Qty: 10 ML | Refills: 1 | Status: SHIPPED | OUTPATIENT
Start: 2024-10-10

## 2024-10-17 ENCOUNTER — APPOINTMENT (OUTPATIENT)
Dept: BEHAVIORAL HEALTH | Facility: CLINIC | Age: 59
End: 2024-10-17
Payer: COMMERCIAL

## 2024-10-17 DIAGNOSIS — F43.10 PTSD (POST-TRAUMATIC STRESS DISORDER): ICD-10-CM

## 2024-10-17 DIAGNOSIS — F33.40 RECURRENT MAJOR DEPRESSIVE DISORDER, IN REMISSION (CMS-HCC): ICD-10-CM

## 2024-10-17 DIAGNOSIS — F41.9 ANXIETY DISORDER, UNSPECIFIED TYPE: ICD-10-CM

## 2024-10-17 PROCEDURE — 99213 OFFICE O/P EST LOW 20 MIN: CPT | Performed by: PSYCHIATRY & NEUROLOGY

## 2024-10-17 NOTE — PROGRESS NOTES
"Outpatient Psychiatry    Subjective   Matty Breaux \"Jerry\", a 58 y.o. male, presenting to Psychiatry for virtual visit for follow up of MDD, recurrent, PTSD and anxiety, las t visit was 7/23/24, pt missed appt on 10/1/24    Virtual or Telephone Consent  An interactive audio and video telecommunication system which permits real time communications between the patient (at the originating site) and provider (at the distant site) was utilized to provide this telehealth service.   Verbal consent was requested and obtained from Matty Breaux on this date, 10/17/24 for a telehealth visit.      HPI:  \"I missed my last appt  because I was sick, I had a sinus infection but tested positive for Covid.\"     Attended step son wedding in Sept, which ews small and outside but wedding  of step daughter this weekend and will be a bigger event and inside. Has thought about how he can get away for period of time, may use hydroxyzine if needed.  Is seeing therapist today, will be seeing her in person today, sometimes meets with her on line.   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      From 6/4/24 visit:  Has two weddings coming up in the family, finds himself getting anxious in anticipation of the larger of the two weddings, in Sept and Oct.  Baby's christening was last weekend.   States youngest sister is still \"being a bit of a recluse.\" She finished teaching for the year, but still " "doesn't communicate with pt. Is reassured that his other sisters see her, and she was still going to work work every day. He feels worried about her, she still works but doesn't have any social life, won't let anyone in her house, is involved in a hoarding situation. Pt has communicated to her that he'd like to help, won't  her. He is planning to try to see her when he is in [place] in the near future.      Psychiatric Review Of Systems:  Depressive Symptoms: negative  Manic Symptoms: negative  Anxiety Symptoms: Negative  Psychotic Symptoms: negative    Current Medications:    Current Outpatient Medications:     BD Luer-Mara Syringe 3 mL 25 x 1 1/2 \" syringe, USE AS DIRECTED, Disp: , Rfl:     buPROPion SR (Wellbutrin SR) 150 mg 12 hr tablet, Take 1 tablet (150 mg) by mouth 2 times a day., Disp: 180 tablet, Rfl: 3    esomeprazole (NexIUM) 40 mg DR capsule, Take 1 capsule (40 mg) by mouth once daily., Disp: 90 capsule, Rfl: 3    hydrOXYzine pamoate (VistariL) 25 mg capsule, Take 1 capsule (25 mg) by mouth once daily as needed for anxiety., Disp: 90 capsule, Rfl: 3    metoprolol succinate XL (Toprol-XL) 25 mg 24 hr tablet, TAKE 1 TABLET DAILY, Disp: 90 tablet, Rfl: 1    mirtazapine (Remeron) 30 mg tablet, Take 1 tablet (30 mg) by mouth once daily at bedtime., Disp: 90 tablet, Rfl: 3    rivaroxaban (Xarelto) 20 mg tablet, Take 1 tablet (20 mg) by mouth once daily., Disp: 90 tablet, Rfl: 1    semaglutide, weight loss, (Wegovy) 0.25 mg/0.5 mL pen injector, Inject 0.25 mg under the skin 1 (one) time per week for 4 doses., Disp: 2 mL, Rfl: 0    sildenafil (Viagra) 100 mg tablet, take one tablet by mouth if needed for erectile dysfunction, Disp: 30 tablet, Rfl: 0    testosterone cypionate (Depo-Testosterone) 200 mg/mL injection, INJECT 0.75 ML EVERY 10 DAYS, Disp: 10 mL, Rfl: 1    Medical History:  Past Medical History:   Diagnosis Date    Depression, unspecified 08/04/2021    Depression    Obstructive sleep apnea " "(adult) (pediatric)     Obstructive sleep apnea    Personal history of other diseases of the musculoskeletal system and connective tissue     History of osteoarthritis    Personal history of other diseases of the musculoskeletal system and connective tissue     History of degenerative disc disease    Personal history of other diseases of the respiratory system 06/03/2014    History of acute bronchitis    Unspecified atrial fibrillation (Multi) 11/24/2013    Atrial fibrillation       Substance Use History:  Tobacco use: Denies  Use of alcohol: denied  Use of caffeine: caffeinated soft drinks 2-3 /day  Use of other substances: Denies    Record Review: brief     Medical Review Of Systems:  Pertinent items are noted in HPI.    OARRS:  OARRS checked and reviewed, filled Testosterone on 10/9/24,  prior to that on 8/13/24, no concerns for misuse.       I have personally reviewed the OARRS report for Matty Breaux. I have considered the risks of abuse, dependence, addiction and diversion and I believe that it is clinically appropriate for Matty Breaux to be prescribed this medication    Is the patient prescribed a combination of a benzodiazepine and opioid?  No    Objective   Mental Status Exam  Appearance: Well groomed and dressed  Attitude: Calm, cooperative, and engaged in conversation.  Behavior: Appropriate eye contact.   Motor Activity: No psychomotor agitation or retardation. No abnormal movements, tremors or tics. No evidence of extrapyramidal symptoms or tardive dyskinesia.  Speech: Regular rate, rhythm, volume. Spontaneous, no pressured speech.  Mood: \"good\"  Affect: Euthymic, full range, mood congruent.  Thought Process: Linear, logical, and goal-directed. No loose associations or gross thought disorganization.  Thought Content: Denied current suicidal ideation or thoughts of harm to self, denied homicidal ideation or thoughts of harm to others. No delusional thinking elicited. No perseverations or " obsessions identified.   Perception: Did not endorse auditory or visual hallucinations, did not appear to be responding to hallucinatory stimuli.   Cognition: Alert, oriented x3. Preserved attention span and concentration, recent and remote memory. Adequate fund of knowledge. No deficits in language.   Insight: Good, in regards to understanding mental health condition  Judgement: Good      Vitals:  There were no vitals filed for this visit.    Other Objective Information:  No visits with results within 3 Month(s) from this visit.   Latest known visit with results is:   Lab on 04/10/2024   Component Date Value Ref Range Status    Thyroid Stimulating Hormone 04/10/2024 3.51  0.44 - 3.98 mIU/L Final    Vitamin B12 04/10/2024 382  211 - 911 pg/mL Final    Iron 04/10/2024 31 (L)  35 - 150 ug/dL Final    UIBC 04/10/2024 400 (H)  110 - 370 ug/dL Final    TIBC 04/10/2024 431  240 - 445 ug/dL Final    % Saturation 04/10/2024 7 (L)  25 - 45 % Final    Vitamin B1, Whole Blood 04/10/2024 133  70 - 180 nmol/L Final         Risk Assessment:  Risk of harm to self: Low Risk -- Risk factors include: History of trauma or abuse , Medical illness comorbidity , and Psychosocial stressors including upcoming family event  Protective factors include:Denies current suicidal ideation, Future-oriented talk , History of adhering to treatment recommendations and/or prescribed medication regimen , Current/history of good response to treatment/meds , and Interpersonal relationships and supports, e.g., family, friends, peers, community     Risk of harm to others: Low Risk - Risk factors include: No significant risk factors identified on screening. Protective factors include: Lack of known history of harm to others     Diagnoses and all orders for this visit:  Recurrent major depressive disorder, in remission (CMS-HCC)  -     Follow Up In Psychiatry  PTSD (post-traumatic stress disorder)  -     Follow Up In Psychiatry  Anxiety disorder, unspecified  type  -     Follow Up In Psychiatry      Plan/Recommendations:    Will continue meds as prescribed: Bupropion SR 150mg bid 90 day supply with 3R, prescribed 4/9/24 and mirtazapine 30mg qhs 90 day supply with 3R prescribed 4/9/24  Continue hydroxyzine 25mg qd prn anxiety, 90 day with 3R prescribed on 4/9/24  Pt to continue to see new therapist  Follow up with medical providers as needed  Follow up with me in 2 months Thurs 12/12/24 at 10am for a virtual appt.   Call  Psychiatry at [phone] with issues, or communicate needs via [email or web address]    Review with patient: Treatment plan reviewed with the patient.  Medication risks/benefit reviewed with the patient    Prep time: 5  Direct patient time: 17  Documentation time: 5  Total time: 27    Марина Santo MD

## 2024-10-24 ENCOUNTER — APPOINTMENT (OUTPATIENT)
Dept: UROLOGY | Facility: CLINIC | Age: 59
End: 2024-10-24
Payer: COMMERCIAL

## 2024-11-11 DIAGNOSIS — N52.39 OTHER POST-PROCEDURAL ERECTILE DYSFUNCTION: ICD-10-CM

## 2024-11-11 RX ORDER — SILDENAFIL 100 MG/1
TABLET, FILM COATED ORAL
Qty: 30 TABLET | Refills: 0 | Status: SHIPPED | OUTPATIENT
Start: 2024-11-11

## 2024-12-10 ENCOUNTER — APPOINTMENT (OUTPATIENT)
Dept: UROLOGY | Facility: CLINIC | Age: 59
End: 2024-12-10
Payer: COMMERCIAL

## 2024-12-12 ENCOUNTER — APPOINTMENT (OUTPATIENT)
Dept: BEHAVIORAL HEALTH | Facility: CLINIC | Age: 59
End: 2024-12-12
Payer: COMMERCIAL

## 2024-12-12 DIAGNOSIS — F41.9 ANXIETY DISORDER, UNSPECIFIED TYPE: ICD-10-CM

## 2024-12-12 DIAGNOSIS — F43.10 PTSD (POST-TRAUMATIC STRESS DISORDER): ICD-10-CM

## 2024-12-12 DIAGNOSIS — F33.40 RECURRENT MAJOR DEPRESSIVE DISORDER, IN REMISSION (CMS-HCC): ICD-10-CM

## 2024-12-12 PROCEDURE — 99213 OFFICE O/P EST LOW 20 MIN: CPT | Performed by: PSYCHIATRY & NEUROLOGY

## 2024-12-12 NOTE — PROGRESS NOTES
"Outpatient Psychiatry    Subjective   Matty Breaux \"Jerry\", a 59 y.o. male, presenting to Psychiatry for virtual visit for follow up MDD, PTSD and Anxiety; last visit was 10/17/24    Virtual or Telephone Consent  An interactive audio and video telecommunication system which permits real time communications between the patient (at the originating site) and provider (at the distant site) was utilized to provide this telehealth service.   Verbal consent was requested and obtained from Matty Breaux on this date, 12/12/24 for a telehealth visit.  Pt is at home and provider in office.    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 We are going to [place] on 12/17/14 to see our granddaughter, and help my son in law fix his fence.\"     Attended wedding of step daughter in oct,a and it went well. States he had a little anxiety at the beginning but it dissipated. Took hydroxyzine before he went. Is  meeting with therapist today, will be seeing her virtually, feels it is going OK.   Are still planning to order exercise equipment for home.  Denies any new medical issues.   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    From 7/23/24 appt:  Tried to visit reclusive sister with sibling when he was in [place] but she would not let them in her house, which has happened before, pt attributes this to her hoarding disorder.  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     Psychiatric Review Of Systems:  Depressive Symptoms: negative  Manic Symptoms: negative  Anxiety Symptoms: Negative  Psychotic Symptoms: negative    Current Medications:    Current Outpatient Medications:     BD Luer-Mara Syringe 3 mL 25 x 1 1/2 \" syringe, USE AS DIRECTED, Disp: , " Rfl:     buPROPion SR (Wellbutrin SR) 150 mg 12 hr tablet, Take 1 tablet (150 mg) by mouth 2 times a day., Disp: 180 tablet, Rfl: 3    esomeprazole (NexIUM) 40 mg DR capsule, Take 1 capsule (40 mg) by mouth once daily., Disp: 90 capsule, Rfl: 3    hydrOXYzine pamoate (VistariL) 25 mg capsule, Take 1 capsule (25 mg) by mouth once daily as needed for anxiety., Disp: 90 capsule, Rfl: 3    metoprolol succinate XL (Toprol-XL) 25 mg 24 hr tablet, TAKE 1 TABLET DAILY, Disp: 90 tablet, Rfl: 1    mirtazapine (Remeron) 30 mg tablet, Take 1 tablet (30 mg) by mouth once daily at bedtime., Disp: 90 tablet, Rfl: 3    rivaroxaban (Xarelto) 20 mg tablet, Take 1 tablet (20 mg) by mouth once daily., Disp: 90 tablet, Rfl: 1    semaglutide, weight loss, (Wegovy) 0.25 mg/0.5 mL pen injector, Inject 0.25 mg under the skin 1 (one) time per week for 4 doses., Disp: 2 mL, Rfl: 0    sildenafil (Viagra) 100 mg tablet, TAKE ONE TABLET BY MOUTH AS NEEDED for erectile dysfunction, Disp: 30 tablet, Rfl: 0    testosterone cypionate (Depo-Testosterone) 200 mg/mL injection, INJECT 0.75 ML EVERY 10 DAYS, Disp: 10 mL, Rfl: 1    Medical History:  Past Medical History:   Diagnosis Date    Depression, unspecified 08/04/2021    Depression    Obstructive sleep apnea (adult) (pediatric)     Obstructive sleep apnea    Personal history of other diseases of the musculoskeletal system and connective tissue     History of osteoarthritis    Personal history of other diseases of the musculoskeletal system and connective tissue     History of degenerative disc disease    Personal history of other diseases of the respiratory system 06/03/2014    History of acute bronchitis    Unspecified atrial fibrillation (Multi) 11/24/2013    Atrial fibrillation       Substance Use History:  Tobacco use: Denies  Use of alcohol: denied  Use of caffeine: caffeinated soft drinks 2-3 /day  Use of other substances: Denies    Record Review: brief     Medical Review Of  "Systems:  Pertinent items are noted in HPI.    OARRS:  Last filled Testosterone 200mg/ml #3 for 30 day supply on 11/11/24, and prior to that on 10/9/24, no concerns for misuse  I have personally reviewed the OARRS report for Matty Breaux. I have considered the risks of abuse, dependence, addiction and diversion and I believe that it is clinically appropriate for Matty Breaux to be prescribed this medication  Is the patient prescribed a combination of a benzodiazepine and opioid?  No    Objective   Mental Status Exam  Appearance: Well groomed and dressed  Attitude: Calm, cooperative, and engaged in conversation.  Behavior: Appropriate eye contact.   Motor Activity: No psychomotor agitation or retardation. No abnormal movements, tremors or tics. No evidence of extrapyramidal symptoms or tardive dyskinesia.  Speech: Regular rate, rhythm, volume. Spontaneous, no pressured speech.  Mood: \"Good\"  Affect: Euthymic, full range, mood congruent.  Thought Process: Linear, logical, and goal-directed. No loose associations or gross thought disorganization.  Thought Content: Denied current suicidal ideation or thoughts of harm to self, denied homicidal ideation or thoughts of harm to others. No delusional thinking elicited. No perseverations or obsessions identified.   Perception: Did not endorse auditory or visual hallucinations, did not appear to be responding to hallucinatory stimuli.   Cognition: Alert, oriented x3. Preserved attention span and concentration, recent and remote memory. Adequate fund of knowledge. No deficits in language.   Insight: Good, in regards to understanding mental health condition  Judgement: Good    Vitals:  There were no vitals filed for this visit.    Other Objective Information:  No visits with results within 3 Month(s) from this visit.   Latest known visit with results is:   Lab on 04/10/2024   Component Date Value Ref Range Status    Thyroid Stimulating Hormone 04/10/2024 3.51  0.44 - " 3.98 mIU/L Final    Vitamin B12 04/10/2024 382  211 - 911 pg/mL Final    Iron 04/10/2024 31 (L)  35 - 150 ug/dL Final    UIBC 04/10/2024 400 (H)  110 - 370 ug/dL Final    TIBC 04/10/2024 431  240 - 445 ug/dL Final    % Saturation 04/10/2024 7 (L)  25 - 45 % Final    Vitamin B1, Whole Blood 04/10/2024 133  70 - 180 nmol/L Final         Risk Assessment:  Risk of harm to self: Low Risk -- Risk factors include: History of trauma or abuse , Medical illness comorbidity , and Psychosocial stressors including visiting family for holidays  Protective factors include:Denies current suicidal ideation, Future-oriented talk , Access to a variety of clinical interventions , History of adhering to treatment recommendations and/or prescribed medication regimen , Current/history of good response to treatment/meds , and Interpersonal relationships and supports, e.g., family, friends, peers, community     Risk of harm to others: Low Risk - Risk factors include: No significant risk factors identified on screening. Protective factors include: Lack of known history of harm to others     Diagnoses and all orders for this visit:  Recurrent major depressive disorder, in remission (CMS-HCC)  -     Follow Up In Psychiatry  PTSD (post-traumatic stress disorder)  -     Follow Up In Psychiatry  Anxiety disorder, unspecified type  -     Follow Up In Psychiatry      Plan/Recommendations:    Will continue meds as prescribed: Bupropion SR 150mg bid 90 day supply with 3R, prescribed 4/9/24 and mirtazapine 30mg qhs 90 day supply with 3R prescribed 4/9/24  Continue hydroxyzine 25mg qd prn anxiety, 90 day with 3R prescribed on 4/9/24  Pt to continue to see new therapist  Follow up with medical providers as needed  Follow up with me in 2 months Thurs 2/6/25 at 10:30am for a virtual appt.   Call  Psychiatry at [phone] with issues, or communicate needs via [employer]     Time Spent:  Prep time: 5  Direct patient time: 18  Documentation time: 5  Total  time: 28    Марина Santo MD

## 2024-12-26 DIAGNOSIS — N52.39 OTHER POST-PROCEDURAL ERECTILE DYSFUNCTION: ICD-10-CM

## 2025-01-02 RX ORDER — SILDENAFIL 100 MG/1
TABLET, FILM COATED ORAL
Qty: 30 TABLET | Refills: 0 | Status: SHIPPED | OUTPATIENT
Start: 2025-01-02

## 2025-01-31 ENCOUNTER — APPOINTMENT (OUTPATIENT)
Dept: UROLOGY | Facility: CLINIC | Age: 60
End: 2025-01-31
Payer: COMMERCIAL

## 2025-02-06 ENCOUNTER — APPOINTMENT (OUTPATIENT)
Dept: BEHAVIORAL HEALTH | Facility: CLINIC | Age: 60
End: 2025-02-06
Payer: COMMERCIAL

## 2025-02-11 DIAGNOSIS — E29.1 HYPOGONADISM IN MALE: ICD-10-CM

## 2025-02-11 RX ORDER — TESTOSTERONE CYPIONATE 200 MG/ML
INJECTION, SOLUTION INTRAMUSCULAR
Qty: 10 ML | Refills: 1 | Status: SHIPPED | OUTPATIENT
Start: 2025-02-11

## 2025-03-13 DIAGNOSIS — N52.39 OTHER POST-PROCEDURAL ERECTILE DYSFUNCTION: ICD-10-CM

## 2025-03-13 RX ORDER — SILDENAFIL 100 MG/1
TABLET, FILM COATED ORAL
Qty: 30 TABLET | Refills: 0 | Status: SHIPPED | OUTPATIENT
Start: 2025-03-13

## 2025-03-20 ENCOUNTER — APPOINTMENT (OUTPATIENT)
Dept: UROLOGY | Facility: CLINIC | Age: 60
End: 2025-03-20
Payer: COMMERCIAL

## 2025-04-10 DIAGNOSIS — N52.39 OTHER POST-PROCEDURAL ERECTILE DYSFUNCTION: ICD-10-CM

## 2025-04-10 RX ORDER — SILDENAFIL 100 MG/1
TABLET, FILM COATED ORAL
Qty: 30 TABLET | Refills: 0 | Status: SHIPPED | OUTPATIENT
Start: 2025-04-10

## 2025-05-16 DIAGNOSIS — I48.91 UNSPECIFIED ATRIAL FIBRILLATION (MULTI): ICD-10-CM

## 2025-05-16 DIAGNOSIS — N52.39 OTHER POST-PROCEDURAL ERECTILE DYSFUNCTION: ICD-10-CM

## 2025-05-18 RX ORDER — RIVAROXABAN 20 MG/1
20 TABLET, FILM COATED ORAL DAILY
Qty: 30 TABLET | Refills: 0 | Status: SHIPPED | OUTPATIENT
Start: 2025-05-18

## 2025-05-19 RX ORDER — SILDENAFIL 100 MG/1
TABLET, FILM COATED ORAL
Qty: 30 TABLET | Refills: 0 | Status: SHIPPED | OUTPATIENT
Start: 2025-05-19

## 2025-06-17 ASSESSMENT — PROMIS GLOBAL HEALTH SCALE
RATE_AVERAGE_PAIN: 2
RATE_PHYSICAL_HEALTH: GOOD
CARRYOUT_PHYSICAL_ACTIVITIES: MODERATELY
EMOTIONAL_PROBLEMS: SOMETIMES
RATE_SOCIAL_SATISFACTION: FAIR
RATE_QUALITY_OF_LIFE: GOOD
RATE_MENTAL_HEALTH: FAIR
RATE_AVERAGE_FATIGUE: MODERATE
RATE_GENERAL_HEALTH: GOOD
CARRYOUT_SOCIAL_ACTIVITIES: GOOD

## 2025-06-24 ENCOUNTER — APPOINTMENT (OUTPATIENT)
Dept: PRIMARY CARE | Facility: CLINIC | Age: 60
End: 2025-06-24
Payer: COMMERCIAL

## 2025-06-26 DIAGNOSIS — I48.91 UNSPECIFIED ATRIAL FIBRILLATION (MULTI): ICD-10-CM

## 2025-06-26 DIAGNOSIS — N52.39 OTHER POST-PROCEDURAL ERECTILE DYSFUNCTION: ICD-10-CM

## 2025-06-27 RX ORDER — SILDENAFIL 100 MG/1
TABLET, FILM COATED ORAL
Qty: 30 TABLET | Refills: 0 | Status: SHIPPED | OUTPATIENT
Start: 2025-06-27

## 2025-07-08 ASSESSMENT — PROMIS GLOBAL HEALTH SCALE
RATE_GENERAL_HEALTH: GOOD
RATE_MENTAL_HEALTH: FAIR
RATE_AVERAGE_FATIGUE: MODERATE
RATE_SOCIAL_SATISFACTION: FAIR
RATE_PHYSICAL_HEALTH: GOOD
RATE_AVERAGE_PAIN: 2
CARRYOUT_SOCIAL_ACTIVITIES: GOOD
EMOTIONAL_PROBLEMS: SOMETIMES
CARRYOUT_PHYSICAL_ACTIVITIES: MODERATELY
RATE_QUALITY_OF_LIFE: GOOD

## 2025-07-09 ENCOUNTER — APPOINTMENT (OUTPATIENT)
Dept: PRIMARY CARE | Facility: CLINIC | Age: 60
End: 2025-07-09
Payer: COMMERCIAL

## 2025-07-09 ENCOUNTER — HOSPITAL ENCOUNTER (OUTPATIENT)
Dept: RADIOLOGY | Facility: CLINIC | Age: 60
Discharge: HOME | End: 2025-07-09
Payer: COMMERCIAL

## 2025-07-09 VITALS
BODY MASS INDEX: 41.75 KG/M2 | DIASTOLIC BLOOD PRESSURE: 89 MMHG | WEIGHT: 315 LBS | SYSTOLIC BLOOD PRESSURE: 135 MMHG | HEART RATE: 93 BPM | HEIGHT: 73 IN

## 2025-07-09 DIAGNOSIS — L72.0 EPIDERMAL CYST: ICD-10-CM

## 2025-07-09 DIAGNOSIS — E03.9 HYPOTHYROIDISM, UNSPECIFIED TYPE: ICD-10-CM

## 2025-07-09 DIAGNOSIS — R22.1 LUMP IN NECK: ICD-10-CM

## 2025-07-09 DIAGNOSIS — M25.521 ELBOW PAIN, RIGHT: ICD-10-CM

## 2025-07-09 DIAGNOSIS — I48.91 UNSPECIFIED ATRIAL FIBRILLATION (MULTI): ICD-10-CM

## 2025-07-09 DIAGNOSIS — N40.1 BPH WITH OBSTRUCTION/LOWER URINARY TRACT SYMPTOMS: ICD-10-CM

## 2025-07-09 DIAGNOSIS — Z00.00 HEALTH CARE MAINTENANCE: Primary | ICD-10-CM

## 2025-07-09 DIAGNOSIS — C62.90 MALIGNANT NEOPLASM OF TESTICLE, UNSPECIFIED LATERALITY, UNSPECIFIED WHETHER DESCENDED OR UNDESCENDED (MULTI): ICD-10-CM

## 2025-07-09 DIAGNOSIS — N13.8 BPH WITH OBSTRUCTION/LOWER URINARY TRACT SYMPTOMS: ICD-10-CM

## 2025-07-09 DIAGNOSIS — Z12.11 SCREEN FOR COLON CANCER: ICD-10-CM

## 2025-07-09 DIAGNOSIS — C62.12 MALIGNANT NEOPLASM OF DESCENDED LEFT TESTIS (MULTI): ICD-10-CM

## 2025-07-09 DIAGNOSIS — R79.89 LOW TESTOSTERONE IN MALE: ICD-10-CM

## 2025-07-09 DIAGNOSIS — I48.20 CHRONIC ATRIAL FIBRILLATION (MULTI): ICD-10-CM

## 2025-07-09 DIAGNOSIS — N52.9 ED (ERECTILE DYSFUNCTION) OF ORGANIC ORIGIN: ICD-10-CM

## 2025-07-09 PROBLEM — R19.00 ABDOMINAL MASS: Status: RESOLVED | Noted: 2023-10-15 | Resolved: 2025-07-09

## 2025-07-09 PROBLEM — R07.9 CHEST PAIN: Status: RESOLVED | Noted: 2023-10-15 | Resolved: 2025-07-09

## 2025-07-09 PROBLEM — E29.1 HYPOGONADISM MALE: Status: RESOLVED | Noted: 2023-10-15 | Resolved: 2025-07-09

## 2025-07-09 PROBLEM — Z98.84 GASTRIC BYPASS STATUS FOR OBESITY: Status: RESOLVED | Noted: 2023-05-19 | Resolved: 2025-07-09

## 2025-07-09 PROCEDURE — 73080 X-RAY EXAM OF ELBOW: CPT | Mod: RT

## 2025-07-09 PROCEDURE — 3008F BODY MASS INDEX DOCD: CPT | Performed by: INTERNAL MEDICINE

## 2025-07-09 PROCEDURE — G8433 SCR FOR DEP NOT CPT DOC RSN: HCPCS | Performed by: INTERNAL MEDICINE

## 2025-07-09 PROCEDURE — 1036F TOBACCO NON-USER: CPT | Performed by: INTERNAL MEDICINE

## 2025-07-09 PROCEDURE — 99396 PREV VISIT EST AGE 40-64: CPT | Performed by: INTERNAL MEDICINE

## 2025-07-09 PROCEDURE — 73080 X-RAY EXAM OF ELBOW: CPT | Mod: RIGHT SIDE | Performed by: RADIOLOGY

## 2025-07-09 ASSESSMENT — PATIENT HEALTH QUESTIONNAIRE - PHQ9
SUM OF ALL RESPONSES TO PHQ9 QUESTIONS 1 AND 2: 0
1. LITTLE INTEREST OR PLEASURE IN DOING THINGS: NOT AT ALL
2. FEELING DOWN, DEPRESSED OR HOPELESS: NOT AT ALL

## 2025-07-09 NOTE — ASSESSMENT & PLAN NOTE
Continue following with urology for hypogonadism treatment including testosterone placement therapy.  Check PSA CBC

## 2025-07-09 NOTE — PROGRESS NOTES
"Subjective   Patient ID: Matty Breaux \"Monica" is a 59 y.o. male who presents for Annual Exam.        HPI     Patient is a 59-year-old male with past medical history of anxiety atrial fibrillation hypogonadism presents for wellness.    Patient has atrial fibrillation with good rate control.  No longer on metoprolol.  He states cardiology had recommended to stop the medication.  His blood pressure today is 135/89.  Suboptimal.  No headache changes in vision orthopnea.    There is some weight gain compared to 2023.  No significant exercise.  Denies illicit substances or smoking.    Takes his medications as prescribed.  She does have a recurrence of an epidermal cyst and wishes to reestablish with surgery for excision and removal.    Follows with urology for treatment of hypogonadism.    Review of Systems  Constitutional: No fever or chills, No Night Sweats  Eyes: No Blurry Vision or Eye sight problems  ENT: No Nasal Discharge, Hoarseness, sore throat  Cardiovascular: no chest pain, no palpitations and no syncope.   Respiratory: no cough, no shortness of breath during exertion and no shortness of breath at rest.   Gastrointestinal: no abdominal pain, no nausea and no vomiting.   : No issues with urinary stream, burning with urination, no blood in urine or stools  Skin: No Skin rashes or Lesions  Neuro: No Headache, no dizziness or Numbness or tingling  Psych: No Anxiety, depression or sleeping problems  Heme: No Easy bleeding or brusing.     Objective   /89   Pulse 93   Ht 1.854 m (6' 1\")   Wt (!) 157 kg (346 lb)   BMI 45.65 kg/m²     Physical Exam  Constitutional: Alert and in no acute distress. Well developed, well nourished.   Head and Face: Head and face: Normal.    Eyes: Normal external exam. Pupils were equal in size, round, reactive to light (PERRL) with normal accommodation and extraocular movements intact (EOMI).   Ears, Nose, Mouth, and Throat: External inspection of ears and nose: Normal.  " Hearing: Normal.  Nasal mucosa, septum, and turbinates: Normal.  Lips, teeth, and gums: Normal.  Oropharynx: Normal.   Neck: No neck mass was observed. Supple. Thyroid not enlarged and there were no palpable thyroid nodules.   Cardiovascular: Irregularly irregular, normal S1 and S2. Pedal pulses: Normal. No peripheral edema.   Pulmonary: No respiratory distress. Clear bilateral breath sounds.   Abdomen: Soft nontender; no abdominal mass palpated. Normal bowel sounds. No organomegaly.   : Deferred  Musculoskeletal: No joint swelling seen, normal movements of all extremities. Range of motion: Normal.  Muscle strength/tone: Normal.    Skin: Normal skin color and pigmentation, normal skin turgor, and no rash.   Neurologic: Deep tendon reflexes were 2+ and symmetric.   Psychiatric: Judgment and insight: Intact. Mood and affect: Normal.  Lymphatic: No cervical lymphadenopathy. Palpation of lymph nodes in axillae: Normal.  Palpation of lymph nodes in groin: Normal.    Lab Results   Component Value Date    WBC 6.8 02/13/2023    HGB 12.5 (L) 02/13/2023    HCT 45.0 02/13/2023     02/13/2023    CHOL 119 02/13/2023    TRIG 56 02/13/2023    HDL 58.3 02/13/2023    ALT 19 02/13/2023    AST 19 02/13/2023     02/13/2023    K 4.8 02/13/2023     02/13/2023    CREATININE 1.05 02/13/2023    BUN 10 02/13/2023    CO2 30 02/13/2023    TSH 3.51 04/10/2024    PSA 2.45 08/24/2023       Electrocardiogram 12 Lead  Atrial fibrillation with premature ventricular or aberrantly conducted complexes  Right bundle branch block  Septal infarct , age undetermined  Abnormal ECG  No previous ECGs available      Assessment/Plan   Problem List Items Addressed This Visit           ICD-10-CM    Afib (Multi) I48.91    Rate controlled.  On anticoagulation.  Advise follow-up with cardiology.  Clinically euvolemic without any evidence of heart failure         BPH with obstruction/lower urinary tract symptoms N40.1, N13.8    Hypothyroidism  E03.9    Clinically euthyroid.  Check TSH.  Adjust medications pending results         Malignant neoplasm of descended left testis (Multi) C62.12    Lump in neck R22.1    Referral to general surgery for evaluation for excision and removal of epidermal cyst         Low testosterone in male R79.89    Continue following with urology for hypogonadism treatment including testosterone placement therapy.  Check PSA CBC         ED (erectile dysfunction) of organic origin N52.9    Malignant neoplasm of testicle (Multi) C62.90     Other Visit Diagnoses         Codes      Health care maintenance    -  Primary Z00.00    Relevant Orders    CBC    Comprehensive metabolic panel    Lipid Panel    TSH with reflex to Free T4 if abnormal    Albumin-Creatinine Ratio, Urine Random    Prostate Spec.Ag,Screen    Iron and TIBC      Unspecified atrial fibrillation (Multi)     I48.91    Relevant Medications    rivaroxaban (Xarelto) 20 mg tablet      Screen for colon cancer     Z12.11    Relevant Orders    Colonoscopy Screening; Average Risk Patient      Epidermal cyst     L72.0    Relevant Orders    Referral to General Surgery      Elbow pain, right     M25.521    Relevant Orders    XR elbow right 3+ views    Iron and TIBC              Dear Matty Breaux     It was my pleasure to take care of you today in the office. Below are the things we discussed today:    1. Immunizations: Yearly Flu shot is recommended.      Pneumonia vaccine up-to-date    2. Blood Work: Ordered  3. Seen your dentist twice a year  4. Yearly Eye exam is recommended    5. BMI: 45.7  6: Diet recommendations:   Eat Clean, Try to have as many home cooked meals as possible  Avoid processed foods which contain excess calories, sugar, and sodium.    7. Exercise recommendations:   150 minutes a week to maintain your weight     If you have to loose weight, you need a better diet and exercise plan.     8. Please get your Living will / Advance directive completed if you do  not have one already. Please make sure our office has a copy of the latest one.     9. Colonoscopy: ordered  10. PSA: Ordered    11.  Follow-up annually or as needed    Follow up in one year for a Physical. Please call the office before your Physical to see if you need blood work completed prior to your physical.     Please call me if any questions arise from now until your next visit. I will call you after I am done seeing patients. A Doctor is always available by phone when the office is closed. Please feel free to call for help with any problem that you feel shouldn't wait until the office re-opens.

## 2025-07-09 NOTE — PATIENT INSTRUCTIONS
We kindly ask that you take the lead and scheduling your referral appointments to ensure they align best with your availability by calling [phone].  For laboratory tests we encourage you to schedule an appointment online [email or web address] but walk-ins are available as well.  For radiology testing you can call [phone] or [phone] to schedule.  If for any reason you are having difficulty scheduling your appointments please feel free to reach out at our office by calling [phone] to assist further

## 2025-07-09 NOTE — ASSESSMENT & PLAN NOTE
Rate controlled.  On anticoagulation.  Advise follow-up with cardiology.  Clinically euvolemic without any evidence of heart failure

## 2025-07-10 DIAGNOSIS — D75.1 POLYCYTHEMIA: Primary | ICD-10-CM

## 2025-07-10 LAB
ALBUMIN SERPL-MCNC: 4 G/DL (ref 3.6–5.1)
ALBUMIN/CREAT UR: 3 MG/G CREAT
ALP SERPL-CCNC: 70 U/L (ref 35–144)
ALT SERPL-CCNC: 23 U/L (ref 9–46)
ANION GAP SERPL CALCULATED.4IONS-SCNC: 11 MMOL/L (CALC) (ref 7–17)
AST SERPL-CCNC: 20 U/L (ref 10–35)
BILIRUB SERPL-MCNC: 1 MG/DL (ref 0.2–1.2)
BUN SERPL-MCNC: 12 MG/DL (ref 7–25)
CALCIUM SERPL-MCNC: 8.5 MG/DL (ref 8.6–10.3)
CHLORIDE SERPL-SCNC: 104 MMOL/L (ref 98–110)
CHOLEST SERPL-MCNC: 130 MG/DL
CHOLEST/HDLC SERPL: 1.9 (CALC)
CO2 SERPL-SCNC: 25 MMOL/L (ref 20–32)
CREAT SERPL-MCNC: 1.19 MG/DL (ref 0.7–1.3)
CREAT UR-MCNC: 194 MG/DL (ref 20–320)
EGFRCR SERPLBLD CKD-EPI 2021: 70 ML/MIN/1.73M2
ERYTHROCYTE [DISTWIDTH] IN BLOOD BY AUTOMATED COUNT: 13.2 % (ref 11–15)
GLUCOSE SERPL-MCNC: 79 MG/DL (ref 65–99)
HCT VFR BLD AUTO: 56.4 % (ref 38.5–50)
HDLC SERPL-MCNC: 69 MG/DL
HGB BLD-MCNC: 18.6 G/DL (ref 13.2–17.1)
IRON SATN MFR SERPL: 24 % (CALC) (ref 20–48)
IRON SERPL-MCNC: 73 MCG/DL (ref 50–180)
LDLC SERPL CALC-MCNC: 48 MG/DL (CALC)
MCH RBC QN AUTO: 32.8 PG (ref 27–33)
MCHC RBC AUTO-ENTMCNC: 33 G/DL (ref 32–36)
MCV RBC AUTO: 99.5 FL (ref 80–100)
MICROALBUMIN UR-MCNC: 0.6 MG/DL
NONHDLC SERPL-MCNC: 61 MG/DL (CALC)
PLATELET # BLD AUTO: 182 THOUSAND/UL (ref 140–400)
PMV BLD REES-ECKER: 9 FL (ref 7.5–12.5)
POTASSIUM SERPL-SCNC: 4.3 MMOL/L (ref 3.5–5.3)
PROT SERPL-MCNC: 6.6 G/DL (ref 6.1–8.1)
PSA SERPL-MCNC: 2.99 NG/ML
RBC # BLD AUTO: 5.67 MILLION/UL (ref 4.2–5.8)
SODIUM SERPL-SCNC: 140 MMOL/L (ref 135–146)
TIBC SERPL-MCNC: 309 MCG/DL (CALC) (ref 250–425)
TRIGL SERPL-MCNC: 58 MG/DL
TSH SERPL-ACNC: 2.62 MIU/L (ref 0.4–4.5)
WBC # BLD AUTO: 7.5 THOUSAND/UL (ref 3.8–10.8)

## 2025-07-17 ENCOUNTER — APPOINTMENT (OUTPATIENT)
Dept: BEHAVIORAL HEALTH | Facility: CLINIC | Age: 60
End: 2025-07-17
Payer: COMMERCIAL

## 2025-07-17 DIAGNOSIS — F33.40 RECURRENT MAJOR DEPRESSIVE DISORDER, IN REMISSION: ICD-10-CM

## 2025-07-17 DIAGNOSIS — F41.9 ANXIETY DISORDER, UNSPECIFIED TYPE: ICD-10-CM

## 2025-07-17 DIAGNOSIS — F43.10 PTSD (POST-TRAUMATIC STRESS DISORDER): ICD-10-CM

## 2025-07-17 PROCEDURE — 99214 OFFICE O/P EST MOD 30 MIN: CPT | Performed by: PSYCHIATRY & NEUROLOGY

## 2025-07-17 RX ORDER — BUPROPION HYDROCHLORIDE 150 MG/1
150 TABLET, EXTENDED RELEASE ORAL 2 TIMES DAILY
Qty: 180 TABLET | Refills: 3 | Status: SHIPPED | OUTPATIENT
Start: 2025-07-17 | End: 2026-07-12

## 2025-07-17 RX ORDER — HYDROXYZINE PAMOATE 25 MG/1
25 CAPSULE ORAL DAILY PRN
Qty: 90 CAPSULE | Refills: 3 | Status: SHIPPED | OUTPATIENT
Start: 2025-07-17 | End: 2026-07-12

## 2025-07-17 RX ORDER — MIRTAZAPINE 30 MG/1
30 TABLET, FILM COATED ORAL NIGHTLY
Qty: 90 TABLET | Refills: 3 | Status: SHIPPED | OUTPATIENT
Start: 2025-07-17 | End: 2026-07-12

## 2025-07-17 NOTE — PROGRESS NOTES
"Outpatient Psychiatry    Subjective   Matty Breaux \"Jerry\", a 59 y.o. male, presenting to Psychiatry for virtual visit for follow up MDD, PTSD and Anxiety; last visit was 12/12/2024 , pt cancelled follow up appt scheduled for 2/6/25.    Virtual or Telephone Consent    An interactive audio and video telecommunication system which permits real time communications between the patient (at the originating site) and provider (at the distant site) was utilized to provide this telehealth service.   Verbal consent was requested and obtained from Matty Breaux on this date, 07/17/25 for a telehealth visit and the patient's location was confirmed at the time of the visit. Patient is at home, provider in the house.    HPI:  \"Everything is OK, no big changes. 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 Wife notices when he is getting depressed because he gets quiet. Wife and daughter in law had a conversation and both have bene better to each other, just saw their granddaughter over the 4th of July.  Sister sends a text once in a while but that is it. She is still going to work but hardly any communication.  Stats anxiety depends on the situation, was a bit anxious over the 4th of July. Still uses hydroxyzine when he anticipates and anxiety producing situation, like being in a crowded place.      Had recent blood work, RBC count was high, PCP has been in contact with pt and asked him to  have test repeated in a month making sure he drinks enough fluid. Has a cyst on the back of his neck that he had removed 5 years ago and it grew back      Had discussed Wegovy with PCP but was too expensive. Has lost 20-30 lbs this summer, states he is trying to eat healthier and is more active doing projects around the house.      Psychiatric " "Review Of Systems:  Depressive Symptoms: negative  Manic Symptoms: negative  Anxiety Symptoms: Negative  Psychotic Symptoms: negative      Current Medications:  Current Medications[1]    Medical History:  Medical History[2]    Substance Use History:  Tobacco use: Denies  Use of alcohol: denied  Use of caffeine: caffeinated soft drinks 2-3 /day  Use of other substances: Denies    Record Review: brief     Medical Review Of Systems:  Pertinent items are noted in HPI.    [place]ARRS:                 06/29/2025 02/11/2025 1 Testosterone Cyp 200 Mg/ml 3.00 30 [place] [id number] [place] ([id number]) 3  [place]   05/28/2025 02/11/2025 1 Testosterone Cyp 200 Mg/ml 3.00 30 [place] [id number] [place] ([id number]) 2  [place]   04/15/2025 02/11/2025 1 Testosterone Cyp 200 Mg/ml 3.00 30 [place] [id number] [place] ([id number])           I have personally reviewed the OARRS report for Matty Breaux. I have considered the risks of abuse, dependence, addiction and diversion    Is the patient prescribed a combination of a benzodiazepine and opioid?  No      Objective   Mental Status Exam  Appearance: Well grooemd and dressed  Attitude: Calm, cooperative, and engaged in conversation.  Behavior: Appropriate eye contact.   Motor Activity: No psychomotor agitation or retardation. No abnormal movements, tremors or tics. No evidence of extrapyramidal symptoms or tardive dyskinesia.  Speech: Regular rate, rhythm, volume. Spontaneous, no pressured speech.  Mood: \"Mostly good, sometimes off and on\"  Affect: Euthymic, full range, mood congruent.  Thought Process: Linear, logical, and goal-directed. No loose associations or gross thought disorganization.  Thought Content: Denied current suicidal ideation or thoughts of harm to self, denied homicidal ideation or thoughts of harm to others. No delusional thinking elicited. No perseverations or obsessions identified.   Perception: Did not endorse auditory or visual hallucinations, did not appear to be responding to hallucinatory " stimuli.   Cognition: Alert, oriented x3. Preserved attention span and concentration, recent and remote memory. Adequate fund of knowledge. No deficits in language.   Insight: Good, in regards to understanding mental health condition  Judgement: Good      Vitals:  There were no vitals filed for this visit.    Other Objective Information:  Office Visit on 07/09/2025   Component Date Value Ref Range Status    WHITE BLOOD CELL COUNT 07/09/2025 7.5  3.8 - 10.8 Thousand/uL Final    RED BLOOD CELL COUNT 07/09/2025 5.67  4.20 - 5.80 Million/uL Final    HEMOGLOBIN 07/09/2025 18.6 (H)  13.2 - 17.1 g/dL Final    HEMATOCRIT 07/09/2025 56.4 (H)  38.5 - 50.0 % Final    MCV 07/09/2025 99.5  80.0 - 100.0 fL Final    MCH 07/09/2025 32.8  27.0 - 33.0 pg Final    MCHC 07/09/2025 33.0  32.0 - 36.0 g/dL Final    RDW 07/09/2025 13.2  11.0 - 15.0 % Final    PLATELET COUNT 07/09/2025 182  140 - 400 Thousand/uL Final    MPV 07/09/2025 9.0  7.5 - 12.5 fL Final    GLUCOSE 07/09/2025 79  65 - 99 mg/dL Final    UREA NITROGEN (BUN) 07/09/2025 12  7 - 25 mg/dL Final    CREATININE 07/09/2025 1.19  0.70 - 1.30 mg/dL Final    EGFR 07/09/2025 70  > OR = 60 mL/min/1.73m2 Final    SODIUM 07/09/2025 140  135 - 146 mmol/L Final    POTASSIUM 07/09/2025 4.3  3.5 - 5.3 mmol/L Final    CHLORIDE 07/09/2025 104  98 - 110 mmol/L Final    CARBON DIOXIDE 07/09/2025 25  20 - 32 mmol/L Final    ELECTROLYTE BALANCE 07/09/2025 11  7 - 17 mmol/L (calc) Final    CALCIUM 07/09/2025 8.5 (L)  8.6 - 10.3 mg/dL Final    PROTEIN, TOTAL 07/09/2025 6.6  6.1 - 8.1 g/dL Final    ALBUMIN 07/09/2025 4.0  3.6 - 5.1 g/dL Final    BILIRUBIN, TOTAL 07/09/2025 1.0  0.2 - 1.2 mg/dL Final    ALKALINE PHOSPHATASE 07/09/2025 70  35 - 144 U/L Final    AST 07/09/2025 20  10 - 35 U/L Final    ALT 07/09/2025 23  9 - 46 U/L Final    CHOLESTEROL, TOTAL 07/09/2025 130  <200 mg/dL Final    HDL CHOLESTEROL 07/09/2025 69  > OR = 40 mg/dL Final    TRIGLYCERIDES 07/09/2025 58  <150 mg/dL Final     LDL-CHOLESTEROL 07/09/2025 48  mg/dL (calc) Final    CHOL/HDLC RATIO 07/09/2025 1.9  <5.0 (calc) Final    NON HDL CHOLESTEROL 07/09/2025 61  <130 mg/dL (calc) Final    TSH W/REFLEX TO FT4 07/09/2025 2.62  0.40 - 4.50 mIU/L Final    CREATININE, RANDOM URINE 07/09/2025 194  20 - 320 mg/dL Final    ALBUMIN, URINE 07/09/2025 0.6  See Note: mg/dL Final    ALBUMIN/CREATININE RATIO, RANDOM U* 07/09/2025 3  <30 mg/g creat Final    PSA, TOTAL 07/09/2025 2.99  < OR = 4.00 ng/mL Final    IRON, TOTAL 07/09/2025 73  50 - 180 mcg/dL Final    IRON BINDING CAPACITY 07/09/2025 309  250 - 425 mcg/dL (calc) Final    % SATURATION 07/09/2025 24  20 - 48 % (calc) Final       Risk Assessment:  Risk of harm to self: Low Risk -- Risk factors include: History of trauma or abuse , Medical illness comorbidity , and Psychosocial stressors including anniversaries of parents' deaths  Protective factors include:Denies current suicidal ideation, Future-oriented talk , History of adhering to treatment recommendations and/or prescribed medication regimen , Support through ongoing medical and mental healthcare relationships , Current/history of good response to treatment/meds , and Interpersonal relationships and supports, e.g., family, friends, peers, community     Risk of harm to others: Low Risk - Risk factors include: No significant risk factors identified on screening. Protective factors include: Lack of known history of harm to others  and Lack of known history of violent ideation     Diagnoses and all orders for this visit:  Recurrent major depressive disorder, in remission (CMS-HCC)  -     Follow Up In Psychiatry  PTSD (post-traumatic stress disorder)  -     Follow Up In Psychiatry  Anxiety disorder, unspecified type  -     Follow Up In Psychiatry      Plan/Recommendations:    Will continue meds as prescribed: Bupropion SR 150mg bid 90 day supply with 3R, prescribed 7/17/25, mirtazapine 30mg qhs 90 day supply with 3R prescribed 7/17/25 and  "hydroxyzine 25mg qd prn anxiety, 90 day with 3R prescribed on 7/17/25  Pt has not been able to find a new therapist, agrees to Behavioral Health Patient Navigator referral to assist.  Follow up with medical providers as needed  Follow up with me in 2 months Thurs 9/11/25 at 10:30am for a virtual appt.   Call  Psychiatry at [phone] with issues, or communicate needs via MyChart     Review with patient: Treatment plan reviewed with the patient.  Medication risks/benefit reviewed with the patient      Prep time: 5  Direct patient time: 21  Documentation time: 5  Total time: 31    Марина Santo MD         [1]   Current Outpatient Medications:     BD Luer-Mara Syringe 3 mL 25 x 1 1/2 \" syringe, USE AS DIRECTED, Disp: , Rfl:     buPROPion SR (Wellbutrin SR) 150 mg 12 hr tablet, Take 1 tablet (150 mg) by mouth 2 times a day., Disp: 180 tablet, Rfl: 3    esomeprazole (NexIUM) 40 mg DR capsule, Take 1 capsule (40 mg) by mouth once daily., Disp: 90 capsule, Rfl: 3    hydrOXYzine pamoate (VistariL) 25 mg capsule, Take 1 capsule (25 mg) by mouth once daily as needed for anxiety., Disp: 90 capsule, Rfl: 3    mirtazapine (Remeron) 30 mg tablet, Take 1 tablet (30 mg) by mouth once daily at bedtime., Disp: 90 tablet, Rfl: 3    rivaroxaban (Xarelto) 20 mg tablet, Take 1 tablet (20 mg) by mouth once daily., Disp: 90 tablet, Rfl: 3    sildenafil (Viagra) 100 mg tablet, TAKE ONE TABLET BY MOUTH AS NEEDED for erectile dysfunction., Disp: 30 tablet, Rfl: 0    testosterone cypionate (Depo-Testosterone) 200 mg/mL injection, INJECT 0.75 ML EVERY 10 DAYS, Disp: 10 mL, Rfl: 1  [2]   Past Medical History:  Diagnosis Date    ADHD (attention deficit hyperactivity disorder) 1985    Anxiety 2003    Cancer (Multi) 2003    Depression, unspecified 08/04/2021    Depression    Heart disease 1999    Obstructive sleep apnea (adult) (pediatric)     Obstructive sleep apnea    Panic attack     Personal history of other diseases of the " musculoskeletal system and connective tissue     History of osteoarthritis    Personal history of other diseases of the musculoskeletal system and connective tissue     History of degenerative disc disease    Personal history of other diseases of the respiratory system 06/03/2014    History of acute bronchitis    Psychiatric illness     PTSD (post-traumatic stress disorder)     Self-injurious behavior     Unspecified atrial fibrillation (Multi) 11/24/2013    Atrial fibrillation

## 2025-07-21 DIAGNOSIS — N52.39 OTHER POST-PROCEDURAL ERECTILE DYSFUNCTION: ICD-10-CM

## 2025-07-21 RX ORDER — SILDENAFIL 100 MG/1
TABLET, FILM COATED ORAL
Qty: 30 TABLET | Refills: 0 | Status: SHIPPED | OUTPATIENT
Start: 2025-07-21

## 2025-07-25 ENCOUNTER — TELEPHONE (OUTPATIENT)
Dept: OTHER | Age: 60
End: 2025-07-25
Payer: COMMERCIAL

## 2025-07-25 NOTE — TELEPHONE ENCOUNTER
Navigation: attempted to contact patient via phone call on 7/18/25 and 7/25/25 - left vmessages. Closed referral.

## 2025-07-29 ENCOUNTER — APPOINTMENT (OUTPATIENT)
Dept: SURGERY | Facility: CLINIC | Age: 60
End: 2025-07-29
Payer: COMMERCIAL

## 2025-08-14 ENCOUNTER — OFFICE VISIT (OUTPATIENT)
Dept: SURGERY | Facility: CLINIC | Age: 60
End: 2025-08-14
Payer: COMMERCIAL

## 2025-08-14 VITALS
SYSTOLIC BLOOD PRESSURE: 158 MMHG | DIASTOLIC BLOOD PRESSURE: 90 MMHG | WEIGHT: 315 LBS | BODY MASS INDEX: 46.18 KG/M2 | HEART RATE: 97 BPM

## 2025-08-14 DIAGNOSIS — L72.0 EPIDERMAL CYST OF NECK: Primary | ICD-10-CM

## 2025-08-14 PROCEDURE — 99203 OFFICE O/P NEW LOW 30 MIN: CPT | Performed by: SURGERY

## 2025-08-14 PROCEDURE — 99213 OFFICE O/P EST LOW 20 MIN: CPT | Performed by: SURGERY

## 2025-08-14 ASSESSMENT — PAIN SCALES - GENERAL: PAINLEVEL_OUTOF10: 0-NO PAIN

## 2025-08-18 ENCOUNTER — TELEPHONE (OUTPATIENT)
Dept: PRIMARY CARE | Facility: CLINIC | Age: 60
End: 2025-08-18
Payer: COMMERCIAL

## 2025-08-18 DIAGNOSIS — K21.9 GASTRO-ESOPHAGEAL REFLUX DISEASE WITHOUT ESOPHAGITIS: ICD-10-CM

## 2025-08-18 RX ORDER — ESOMEPRAZOLE MAGNESIUM 40 MG/1
40 CAPSULE, DELAYED RELEASE ORAL DAILY
Qty: 90 CAPSULE | Refills: 3 | Status: SHIPPED | OUTPATIENT
Start: 2025-08-18

## 2025-08-20 PROBLEM — L72.0 EPIDERMAL CYST OF NECK: Status: ACTIVE | Noted: 2025-08-14

## 2025-08-25 DIAGNOSIS — E29.1 HYPOGONADISM IN MALE: ICD-10-CM

## 2025-08-26 ENCOUNTER — TELEPHONE (OUTPATIENT)
Dept: UROLOGY | Facility: CLINIC | Age: 60
End: 2025-08-26
Payer: COMMERCIAL

## 2025-08-26 RX ORDER — TESTOSTERONE CYPIONATE 200 MG/ML
INJECTION, SOLUTION INTRAMUSCULAR
Qty: 10 ML | Refills: 0 | OUTPATIENT
Start: 2025-08-26

## 2025-09-11 ENCOUNTER — APPOINTMENT (OUTPATIENT)
Dept: BEHAVIORAL HEALTH | Facility: CLINIC | Age: 60
End: 2025-09-11
Payer: COMMERCIAL

## 2025-09-24 ENCOUNTER — APPOINTMENT (OUTPATIENT)
Dept: UROLOGY | Facility: CLINIC | Age: 60
End: 2025-09-24
Payer: COMMERCIAL

## 2025-10-21 ENCOUNTER — APPOINTMENT (OUTPATIENT)
Dept: UROLOGY | Facility: CLINIC | Age: 60
End: 2025-10-21
Payer: COMMERCIAL